# Patient Record
Sex: MALE | Race: BLACK OR AFRICAN AMERICAN | Employment: OTHER | URBAN - METROPOLITAN AREA
[De-identification: names, ages, dates, MRNs, and addresses within clinical notes are randomized per-mention and may not be internally consistent; named-entity substitution may affect disease eponyms.]

---

## 2017-02-05 ENCOUNTER — HOSPITAL ENCOUNTER (EMERGENCY)
Age: 67
Discharge: HOME | End: 2017-02-05
Attending: EMERGENCY MEDICINE
Payer: MEDICARE

## 2017-02-05 ENCOUNTER — APPOINTMENT (OUTPATIENT)
Dept: GENERAL RADIOLOGY | Age: 67
End: 2017-02-05
Attending: EMERGENCY MEDICINE
Payer: MEDICARE

## 2017-02-05 ENCOUNTER — APPOINTMENT (OUTPATIENT)
Dept: CT IMAGING | Age: 67
End: 2017-02-05
Attending: EMERGENCY MEDICINE
Payer: MEDICARE

## 2017-02-05 VITALS
TEMPERATURE: 98.5 F | BODY MASS INDEX: 23.7 KG/M2 | HEIGHT: 72 IN | HEART RATE: 76 BPM | SYSTOLIC BLOOD PRESSURE: 184 MMHG | WEIGHT: 175 LBS | DIASTOLIC BLOOD PRESSURE: 95 MMHG | OXYGEN SATURATION: 100 % | RESPIRATION RATE: 16 BRPM

## 2017-02-05 DIAGNOSIS — I10 UNCONTROLLED HYPERTENSION: ICD-10-CM

## 2017-02-05 DIAGNOSIS — R42 VERTIGO: ICD-10-CM

## 2017-02-05 DIAGNOSIS — R42 LIGHTHEADEDNESS: Primary | ICD-10-CM

## 2017-02-05 LAB
ALBUMIN SERPL BCP-MCNC: 3.2 G/DL (ref 3.4–5)
ALBUMIN/GLOB SERPL: 0.9 {RATIO} (ref 0.8–1.7)
ALP SERPL-CCNC: 103 U/L (ref 45–117)
ALT SERPL-CCNC: 18 U/L (ref 16–61)
ANION GAP BLD CALC-SCNC: 10 MMOL/L (ref 3–18)
AST SERPL W P-5'-P-CCNC: 14 U/L (ref 15–37)
BASOPHILS # BLD AUTO: 0.1 K/UL (ref 0–0.06)
BASOPHILS # BLD: 1 % (ref 0–2)
BILIRUB SERPL-MCNC: 0.5 MG/DL (ref 0.2–1)
BUN SERPL-MCNC: 14 MG/DL (ref 7–18)
BUN/CREAT SERPL: 11 (ref 12–20)
CALCIUM SERPL-MCNC: 8.8 MG/DL (ref 8.5–10.1)
CHLORIDE SERPL-SCNC: 108 MMOL/L (ref 100–108)
CK MB CFR SERPL CALC: 1.5 % (ref 0–4)
CK MB SERPL-MCNC: 2.4 NG/ML (ref 0.5–3.6)
CK SERPL-CCNC: 157 U/L (ref 39–308)
CO2 SERPL-SCNC: 26 MMOL/L (ref 21–32)
CREAT SERPL-MCNC: 1.27 MG/DL (ref 0.6–1.3)
DIFFERENTIAL METHOD BLD: ABNORMAL
EOSINOPHIL # BLD: 0.2 K/UL (ref 0–0.4)
EOSINOPHIL NFR BLD: 2 % (ref 0–5)
ERYTHROCYTE [DISTWIDTH] IN BLOOD BY AUTOMATED COUNT: 13.2 % (ref 11.6–14.5)
FLUAV AG NPH QL IA: NEGATIVE
FLUBV AG NOSE QL IA: NEGATIVE
GLOBULIN SER CALC-MCNC: 3.7 G/DL (ref 2–4)
GLUCOSE SERPL-MCNC: 92 MG/DL (ref 74–99)
HCT VFR BLD AUTO: 45.7 % (ref 36–48)
HGB BLD-MCNC: 14.6 G/DL (ref 13–16)
LYMPHOCYTES # BLD AUTO: 35 % (ref 21–52)
LYMPHOCYTES # BLD: 3.5 K/UL (ref 0.9–3.6)
MCH RBC QN AUTO: 28.3 PG (ref 24–34)
MCHC RBC AUTO-ENTMCNC: 31.9 G/DL (ref 31–37)
MCV RBC AUTO: 88.7 FL (ref 74–97)
MONOCYTES # BLD: 0.7 K/UL (ref 0.05–1.2)
MONOCYTES NFR BLD AUTO: 7 % (ref 3–10)
NEUTS SEG # BLD: 5.5 K/UL (ref 1.8–8)
NEUTS SEG NFR BLD AUTO: 55 % (ref 40–73)
PLATELET # BLD AUTO: 347 K/UL (ref 135–420)
PMV BLD AUTO: 12.5 FL (ref 9.2–11.8)
POTASSIUM SERPL-SCNC: 3.8 MMOL/L (ref 3.5–5.5)
PROT SERPL-MCNC: 6.9 G/DL (ref 6.4–8.2)
RBC # BLD AUTO: 5.15 M/UL (ref 4.7–5.5)
SODIUM SERPL-SCNC: 144 MMOL/L (ref 136–145)
TROPONIN I SERPL-MCNC: 0.02 NG/ML (ref 0–0.06)
WBC # BLD AUTO: 10 K/UL (ref 4.6–13.2)

## 2017-02-05 PROCEDURE — 87804 INFLUENZA ASSAY W/OPTIC: CPT | Performed by: EMERGENCY MEDICINE

## 2017-02-05 PROCEDURE — 80053 COMPREHEN METABOLIC PANEL: CPT | Performed by: EMERGENCY MEDICINE

## 2017-02-05 PROCEDURE — 74011250637 HC RX REV CODE- 250/637: Performed by: EMERGENCY MEDICINE

## 2017-02-05 PROCEDURE — 85025 COMPLETE CBC W/AUTO DIFF WBC: CPT | Performed by: EMERGENCY MEDICINE

## 2017-02-05 PROCEDURE — 70450 CT HEAD/BRAIN W/O DYE: CPT

## 2017-02-05 PROCEDURE — 94762 N-INVAS EAR/PLS OXIMTRY CONT: CPT

## 2017-02-05 PROCEDURE — 71020 XR CHEST PA LAT: CPT

## 2017-02-05 PROCEDURE — 74011250636 HC RX REV CODE- 250/636: Performed by: EMERGENCY MEDICINE

## 2017-02-05 PROCEDURE — 96361 HYDRATE IV INFUSION ADD-ON: CPT

## 2017-02-05 PROCEDURE — 93005 ELECTROCARDIOGRAM TRACING: CPT

## 2017-02-05 PROCEDURE — 99285 EMERGENCY DEPT VISIT HI MDM: CPT

## 2017-02-05 PROCEDURE — 82550 ASSAY OF CK (CPK): CPT | Performed by: EMERGENCY MEDICINE

## 2017-02-05 PROCEDURE — 96360 HYDRATION IV INFUSION INIT: CPT

## 2017-02-05 RX ORDER — AMLODIPINE BESYLATE 5 MG/1
5 TABLET ORAL DAILY
Qty: 20 TAB | Refills: 0 | Status: SHIPPED | OUTPATIENT
Start: 2017-02-05 | End: 2017-02-25

## 2017-02-05 RX ORDER — RANITIDINE 150 MG/1
150 TABLET, FILM COATED ORAL 2 TIMES DAILY
COMMUNITY
End: 2020-06-28

## 2017-02-05 RX ORDER — AMLODIPINE BESYLATE 5 MG/1
5 TABLET ORAL
Status: COMPLETED | OUTPATIENT
Start: 2017-02-05 | End: 2017-02-05

## 2017-02-05 RX ORDER — HYDROCHLOROTHIAZIDE 25 MG/1
25 TABLET ORAL
Status: DISCONTINUED | OUTPATIENT
Start: 2017-02-05 | End: 2017-02-05

## 2017-02-05 RX ORDER — MECLIZINE HYDROCHLORIDE 25 MG/1
25 TABLET ORAL
Qty: 21 TAB | Refills: 0 | Status: SHIPPED | OUTPATIENT
Start: 2017-02-05 | End: 2017-02-12

## 2017-02-05 RX ORDER — MECLIZINE HCL 12.5 MG 12.5 MG/1
25 TABLET ORAL ONCE
Status: COMPLETED | OUTPATIENT
Start: 2017-02-05 | End: 2017-02-05

## 2017-02-05 RX ORDER — GUAIFENESIN 100 MG/5ML
324 LIQUID (ML) ORAL
Status: COMPLETED | OUTPATIENT
Start: 2017-02-05 | End: 2017-02-05

## 2017-02-05 RX ADMIN — SODIUM CHLORIDE 500 ML: 900 INJECTION, SOLUTION INTRAVENOUS at 17:32

## 2017-02-05 RX ADMIN — AMLODIPINE BESYLATE 5 MG: 5 TABLET ORAL at 19:22

## 2017-02-05 RX ADMIN — ASPIRIN 81 MG 324 MG: 81 TABLET ORAL at 17:32

## 2017-02-05 RX ADMIN — MECLIZINE 25 MG: 12.5 TABLET ORAL at 20:45

## 2017-02-05 NOTE — ED NOTES
Dr. Issa Bean made aware of patients blood pressures, at this time he wants to wait for CT results prior to medication administration.

## 2017-02-05 NOTE — ED TRIAGE NOTES
Patient brought in by EMS from home with complaints of dizziness, generalized weakness, and generalized body aches x 3days. Pt states \"all my joints hurt\" per patient he is unable to walk to due weakness. Denies any falls. Sepsis Screening completed    (  )Patient meets SIRS criteria. ( x )Patient does not meet SIRS criteria.       SIRS Criteria is achieved when two or more of the following are present   Temperature < 96.8°F (36°C) or > 100.9°F (38.3°C)   Heart Rate > 90 beats per minute   Respiratory Rate > 20 beats per minute   WBC count > 12,000 or <4,000 or > 10% bands

## 2017-02-05 NOTE — ED PROVIDER NOTES
HPI Comments:   5:09 PM   Renay Cartwright is a 78 y.o. male presenting to the ED C/O generalized body aches at joints onset a few days ago. Pt reports he also has sternal pain. Associated sx's include dizziness that causes him to become weak and unable to walk, chills, and cough. Family history includes DM. Pt smokes 1 pack of cigarettes a day. Pt takes Zantac. Pt was last seen by PCP 7 years ago. Pt denies fever, nausea, vomiting, dysuria, change in urine color, HTN, DM, high cholesterol, heart problems, drinking alcohol, appendectomy, cholecystectomy, use of illicit drugs and any other symptoms or complaints. Written by TRACEY Jimenez, as dictated by Padmini Soliz MD      Patient is a 78 y.o. male presenting with weakness and general illness. The history is provided by the patient. No  was used. Extremity Weakness    This is a new problem. The current episode started more than 2 days ago. The problem occurs daily. The problem has not changed since onset. Generalized Body Aches          Past Medical History:   Diagnosis Date    Peptic ulcer disease        Past Surgical History:   Procedure Laterality Date    Hx orthopaedic       knee surgery         History reviewed. No pertinent family history. Social History     Social History    Marital status: SINGLE     Spouse name: N/A    Number of children: N/A    Years of education: N/A     Occupational History    Not on file. Social History Main Topics    Smoking status: Current Every Day Smoker    Smokeless tobacco: Not on file    Alcohol use No    Drug use: No    Sexual activity: Not on file     Other Topics Concern    Not on file     Social History Narrative    No narrative on file         ALLERGIES: Sulfa (sulfonamide antibiotics)    Review of Systems   Constitutional: Positive for chills. Negative for fever. Respiratory: Positive for cough. Gastrointestinal: Negative for nausea and vomiting.    Genitourinary: Negative for dysuria. Musculoskeletal: Positive for arthralgias. Neurological: Positive for dizziness and weakness. All other systems reviewed and are negative. Vitals:    02/05/17 1830 02/05/17 1900 02/05/17 1922 02/05/17 1957   BP: (!) 182/99  (!) 206/120 (!) 194/91   Pulse: 76 76 83 82   Resp: 19 25     Temp:       SpO2: 99% 99%     Weight:       Height:                Physical Exam   Constitutional: He is oriented to person, place, and time. He appears well-developed and well-nourished. HENT:   Head: Normocephalic and atraumatic. Right Ear: External ear normal.   Left Ear: External ear normal.   Nose: Nose normal.   Mouth/Throat: Oropharynx is clear and moist.   Eyes: Conjunctivae and EOM are normal. Pupils are equal, round, and reactive to light. Neck: Normal range of motion. Neck supple. No JVD present. No tracheal deviation present. No thyromegaly present. Cardiovascular: Normal rate, regular rhythm, normal heart sounds and intact distal pulses. Exam reveals no gallop and no friction rub. No murmur heard. Pulmonary/Chest: Effort normal and breath sounds normal. No respiratory distress. He has no wheezes. He has no rales. Abdominal: Soft. Bowel sounds are normal. He exhibits no distension and no mass. There is no tenderness. There is no rebound and no guarding. Musculoskeletal: Normal range of motion. Neurological: He is alert and oriented to person, place, and time. He has normal reflexes. No cranial nerve deficit. CN II-XII intact, no facial droop or asymmetry; No pronator drift; finger-nose-finger intact; good  and equal strength 5/5 bilateral upper and lower extremities; DTRs: 2+ upper and lower extremities, symmetric bilaterally; sensation is intact to light touch and position sense upper and lower extremities symmetric bilaterally;  Gait normal   Skin: Skin is warm and dry. No rash noted. Psychiatric: He has a normal mood and affect.  His behavior is normal. Nursing note and vitals reviewed. Vital signs and nursing notes reviewed    CONSTITUTIONAL: Alert, in no apparent distress; well-developed; well-nourished. Pt became light headed when he sat up for respiratory exam   HEAD:  Normocephalic, atraumatic  EYES: PERRL; EOM's intact. ENTM: Nose: no rhinorrhea; Throat: no erythema or exudate, mucous membranes moist  Neck:  No JVD, supple without lymphadenopathy  RESP: Chest clear, equal breath sounds. CV: S1 and S2 WNL; No murmurs, gallops or rubs. GI: Normal bowel sounds, abdomen soft and non-tender. No masses or organomegaly. : No costo-vertebral angle tenderness. BACK:  Non-tender  UPPER EXT:  Normal inspection +5/5 strength bilaterally     LOWER EXT: No edema, no calf tenderness. Distal pulses intact. +5/5 strength bilaterally  NEURO: CN intact, reflexes 2/4 and sym, strength 5/5 and sym, sensation intact. SKIN: No rashes; Normal for age and stage. PSYCH:  Alert and oriented, normal affect. RESULTS:    EKG interpretation: (Preliminary)  5:20 PM   Rate 80 bpm. NSR. Nonspecific T wave abnormality. Abnormal ECG. No stemi. EKG read by Rafaela Brewer MD  at 16:56     CT HEAD WO CONT   Final Result   No CT evidence of acute intracranial abnormality. As read by the radiologist.   XR CHEST PA LAT   Final Result   No radiographic evidence of acute cardiopulmonary abnormality. As read by the radiologist.        Labs Reviewed   CBC WITH AUTOMATED DIFF - Abnormal; Notable for the following:        Result Value    MPV 12.5 (*)     ABS.  BASOPHILS 0.1 (*)     All other components within normal limits   METABOLIC PANEL, COMPREHENSIVE - Abnormal; Notable for the following:     BUN/Creatinine ratio 11 (*)     GFR est non-AA 57 (*)     AST (SGOT) 14 (*)     Albumin 3.2 (*)     All other components within normal limits   INFLUENZA A & B AG (RAPID TEST)   CARDIAC PANEL,(CK, CKMB & TROPONIN)       Recent Results (from the past 12 hour(s))   EKG, 12 LEAD, INITIAL Collection Time: 02/05/17  4:56 PM   Result Value Ref Range    Ventricular Rate 80 BPM    Atrial Rate 80 BPM    P-R Interval 152 ms    QRS Duration 86 ms    Q-T Interval 382 ms    QTC Calculation (Bezet) 440 ms    Calculated P Axis 62 degrees    Calculated R Axis 30 degrees    Calculated T Axis 50 degrees    Diagnosis       Normal sinus rhythm  Nonspecific T wave abnormality  Abnormal ECG  No previous ECGs available     CBC WITH AUTOMATED DIFF    Collection Time: 02/05/17  5:00 PM   Result Value Ref Range    WBC 10.0 4.6 - 13.2 K/uL    RBC 5.15 4.70 - 5.50 M/uL    HGB 14.6 13.0 - 16.0 g/dL    HCT 45.7 36.0 - 48.0 %    MCV 88.7 74.0 - 97.0 FL    MCH 28.3 24.0 - 34.0 PG    MCHC 31.9 31.0 - 37.0 g/dL    RDW 13.2 11.6 - 14.5 %    PLATELET 567 064 - 995 K/uL    MPV 12.5 (H) 9.2 - 11.8 FL    NEUTROPHILS 55 40 - 73 %    LYMPHOCYTES 35 21 - 52 %    MONOCYTES 7 3 - 10 %    EOSINOPHILS 2 0 - 5 %    BASOPHILS 1 0 - 2 %    ABS. NEUTROPHILS 5.5 1.8 - 8.0 K/UL    ABS. LYMPHOCYTES 3.5 0.9 - 3.6 K/UL    ABS. MONOCYTES 0.7 0.05 - 1.2 K/UL    ABS. EOSINOPHILS 0.2 0.0 - 0.4 K/UL    ABS.  BASOPHILS 0.1 (H) 0.0 - 0.06 K/UL    DF AUTOMATED     INFLUENZA A & B AG (RAPID TEST)    Collection Time: 02/05/17  5:34 PM   Result Value Ref Range    Influenza A Antigen NEGATIVE  NEG      Influenza B Antigen NEGATIVE  NEG     CARDIAC PANEL,(CK, CKMB & TROPONIN)    Collection Time: 02/05/17  7:09 PM   Result Value Ref Range     39 - 308 U/L    CK - MB 2.4 0.5 - 3.6 ng/ml    CK-MB Index 1.5 0.0 - 4.0 %    Troponin-I, Qt. 0.02 0.00 - 2.87 NG/ML   METABOLIC PANEL, COMPREHENSIVE    Collection Time: 02/05/17  7:09 PM   Result Value Ref Range    Sodium 144 136 - 145 mmol/L    Potassium 3.8 3.5 - 5.5 mmol/L    Chloride 108 100 - 108 mmol/L    CO2 26 21 - 32 mmol/L    Anion gap 10 3.0 - 18 mmol/L    Glucose 92 74 - 99 mg/dL    BUN 14 7.0 - 18 MG/DL    Creatinine 1.27 0.6 - 1.3 MG/DL    BUN/Creatinine ratio 11 (L) 12 - 20      GFR est AA >60 >60 ml/min/1.73m2    GFR est non-AA 57 (L) >60 ml/min/1.73m2    Calcium 8.8 8.5 - 10.1 MG/DL    Bilirubin, total 0.5 0.2 - 1.0 MG/DL    ALT (SGPT) 18 16 - 61 U/L    AST (SGOT) 14 (L) 15 - 37 U/L    Alk. phosphatase 103 45 - 117 U/L    Protein, total 6.9 6.4 - 8.2 g/dL    Albumin 3.2 (L) 3.4 - 5.0 g/dL    Globulin 3.7 2.0 - 4.0 g/dL    A-G Ratio 0.9 0.8 - 1.7          MDM  Number of Diagnoses or Management Options  Lightheadedness:   Uncontrolled hypertension:   Vertigo:   Diagnosis management comments: INITIAL CLINICAL IMPRESSION and PLANS:  The patient presents with the primary complaint(s) of: weakness. The presentation, to include historical aspects and clinical findings are consistent with the DX of uncontrolled hypertension . However, other possible DX's to consider as primary, associated with, or exacerbated by include:    1. Influenza  2. ACS  3. Metabolic  4. ICH    Considering the above, my initial management plan to evaluate and therapeutic interventions include the following and as noted in the orders:    1. Labs: CBC, CMP, cardiac panel  2. Imaging: CXR, head CT     The patient was stable in the ED. ECG and troponin showed no evidence of ACS. Head CT scan unremarkable. CXR unremarkable. Labs unremarkable. Patient was given meclizine and Norvasc with improvement. Vertigo resolved. BP improved. The patient will follow-up with PCP and outpatient Cardiology and Neurology evaluations.   Written by Carol Ann Barahona MD.       Amount and/or Complexity of Data Reviewed  Clinical lab tests: ordered and reviewed  Tests in the radiology section of CPT®: ordered and reviewed (Head CT, CXR)  Tests in the medicine section of CPT®: reviewed and ordered (EKG)  Discuss the patient with other providers: yes Rebecca Martin MD (ED attending))  Independent visualization of images, tracings, or specimens: yes (EKG, CXR)      ED Course     MEDICATIONS GIVEN:  Medications   meclizine (ANTIVERT) tablet 25 mg (not administered)   aspirin chewable tablet 324 mg (324 mg Oral Given 2/5/17 1732)   sodium chloride 0.9 % bolus infusion 500 mL (0 mL IntraVENous IV Completed 2/5/17 1922)   amLODIPine (NORVASC) tablet 5 mg (5 mg Oral Given 2/5/17 1922)        Procedures    PROGRESS NOTE:  5:09 PM  Initial assessment performed. Written by Adarsh Harrison, ED Scribe, as dictated by Mone Brush MD     BEDSIDE TRANSFER OF CARE:  7:04 PM  Patient care will be transferred from Mone Brush MD  to Eliseo Orozco MD .  Discussed available diagnostic results and care plan at length at beside. Patient and family made aware of provider change. All questions answered. Patient and family voiced understanding. Written by Adarsh Harrison, TRACEY Scribe, as dictated by Mone Brush MD .     08:20PM  Patient improved with resolution of vertigo and decreased BP. Patient will follow-up with PCP. DISCHARGE NOTE:  8:26 PM   Anette Pollen  results have been reviewed with him. He has been counseled regarding his diagnosis, treatment, and plan. He verbally conveys understanding and agreement of the signs, symptoms, diagnosis, treatment and prognosis and additionally agrees to follow up as discussed. He also agrees with the care-plan and conveys that all of his questions have been answered. I have also provided discharge instructions for him that include: educational information regarding their diagnosis and treatment, and list of reasons why they would want to return to the ED prior to their follow-up appointment, should his condition change. The patient and/or family has been provided with education for proper Emergency Department utilization. CLINICAL IMPRESSION:    1. Lightheadedness    2. Vertigo    3.  Uncontrolled hypertension        PLAN: DISCHARGE HOME    Follow-up Information     Follow up With Details Comments Contact Info    Balta Pro, DO Schedule an appointment as soon as possible for a visit in 2 days or follow up with your PCP [address] [address]  [phone]      Kirsten Woo MD Schedule an appointment as soon as possible for a visit in 2 days Follow up with cardiology [address]  [phone]      Reyes Lombardi MD Schedule an appointment as soon as possible for a visit in 2 days Follow up with neurology [address]      THE [facility] EMERGENCY DEPT Go to As needed, If symptoms worsen [address] [place] [address]  [phone]          Current Discharge Medication List      START taking these medications    Details   meclizine (ANTIVERT) 25 mg tablet Take 1 Tab by mouth three (3) times daily as needed for up to 7 days. Qty: 21 Tab, Refills: 0      amLODIPine (NORVASC) 5 mg tablet Take 1 Tab by mouth daily for 20 days. Qty: 20 Tab, Refills: 0         CONTINUE these medications which have NOT CHANGED    Details   raNITIdine (ZANTAC) 150 mg tablet Take 150 mg by mouth two (2) times a day. ATTESTATIONS:  This note is prepared by Ines Tucker, acting as Scribe for Shaneka Archer MD .    Shaneka Archer MD : The scribe's documentation has been prepared under my direction and personally reviewed by me in its entirety. I confirm that the note above accurately reflects all work, treatment, procedures, and medical decision making performed by me.

## 2017-02-06 NOTE — ED NOTES
Pt discharged home stable via wheelchair. Pain level at discharge unchanged. Pt discharged with spouse. Reviewed discharged instructions with patient who verbalized understanding.   Patient armband removed and shredded

## 2017-02-06 NOTE — ED NOTES
Report received. Care of pt assumed at this time. Pt resting in position of comfort. Family at bedside. Call bell in reach. Updated on plan of care, verbalized understanding.  Provided with urinal.

## 2017-02-06 NOTE — DISCHARGE INSTRUCTIONS
Please start a low salt diet (less than 2 grams). High Blood Pressure: Care Instructions  Your Care Instructions  If your blood pressure is usually above 140/90, you have high blood pressure, or hypertension. That means the top number is 140 or higher or the bottom number is 90 or higher, or both. Despite what a lot of people think, high blood pressure usually doesn't cause headaches or make you feel dizzy or lightheaded. It usually has no symptoms. But it does increase your risk for heart attack, stroke, and kidney or eye damage. The higher your blood pressure, the more your risk increases. Your doctor will give you a goal for your blood pressure. Your goal will be based on your health and your age. An example of a goal is to keep your blood pressure below 140/90. Lifestyle changes, such as eating healthy and being active, are always important to help lower blood pressure. You might also take medicine to reach your blood pressure goal.  Follow-up care is a key part of your treatment and safety. Be sure to make and go to all appointments, and call your doctor if you are having problems. It's also a good idea to know your test results and keep a list of the medicines you take. How can you care for yourself at home? Medical treatment  · If you stop taking your medicine, your blood pressure will go back up. You may take one or more types of medicine to lower your blood pressure. Be safe with medicines. Take your medicine exactly as prescribed. Call your doctor if you think you are having a problem with your medicine. · Talk to your doctor before you start taking aspirin every day. Aspirin can help certain people lower their risk of a heart attack or stroke. But taking aspirin isn't right for everyone, because it can cause serious bleeding. · See your doctor regularly. You may need to see the doctor more often at first or until your blood pressure comes down.   · If you are taking blood pressure medicine, talk to your doctor before you take decongestants or anti-inflammatory medicine, such as ibuprofen. Some of these medicines can raise blood pressure. · Learn how to check your blood pressure at home. Lifestyle changes  · Stay at a healthy weight. This is especially important if you put on weight around the waist. Losing even 10 pounds can help you lower your blood pressure. · If your doctor recommends it, get more exercise. Walking is a good choice. Bit by bit, increase the amount you walk every day. Try for at least 30 minutes on most days of the week. You also may want to swim, bike, or do other activities. · Avoid or limit alcohol. Talk to your doctor about whether you can drink any alcohol. · Try to limit how much sodium you eat to less than 2,300 milligrams (mg) a day. Your doctor may ask you to try to eat less than 1,500 mg a day. · Eat plenty of fruits (such as bananas and oranges), vegetables, legumes, whole grains, and low-fat dairy products. · Lower the amount of saturated fat in your diet. Saturated fat is found in animal products such as milk, cheese, and meat. Limiting these foods may help you lose weight and also lower your risk for heart disease. · Do not smoke. Smoking increases your risk for heart attack and stroke. If you need help quitting, talk to your doctor about stop-smoking programs and medicines. These can increase your chances of quitting for good. When should you call for help? Call 911 anytime you think you may need emergency care. This may mean having symptoms that suggest that your blood pressure is causing a serious heart or blood vessel problem. Your blood pressure may be over 180/110. For example, call 911 if:  · You have symptoms of a heart attack. These may include:  ¨ Chest pain or pressure, or a strange feeling in the chest.  ¨ Sweating. ¨ Shortness of breath. ¨ Nausea or vomiting.   ¨ Pain, pressure, or a strange feeling in the back, neck, jaw, or upper belly or in one or both shoulders or arms. ¨ Lightheadedness or sudden weakness. ¨ A fast or irregular heartbeat. · You have symptoms of a stroke. These may include:  ¨ Sudden numbness, tingling, weakness, or loss of movement in your face, arm, or leg, especially on only one side of your body. ¨ Sudden vision changes. ¨ Sudden trouble speaking. ¨ Sudden confusion or trouble understanding simple statements. ¨ Sudden problems with walking or balance. ¨ A sudden, severe headache that is different from past headaches. · You have severe back or belly pain. Do not wait until your blood pressure comes down on its own. Get help right away. Call your doctor now or seek immediate care if:  · Your blood pressure is much higher than normal (such as 180/110 or higher), but you don't have symptoms. · You think high blood pressure is causing symptoms, such as:  ¨ Severe headache. ¨ Blurry vision. Watch closely for changes in your health, and be sure to contact your doctor if:  · Your blood pressure measures 140/90 or higher at least 2 times. That means the top number is 140 or higher or the bottom number is 90 or higher, or both. · You think you may be having side effects from your blood pressure medicine. · Your blood pressure is usually normal, but it goes above normal at least 2 times. Where can you learn more? Go to http://ayan-bjorn.info/. Enter L661 in the search box to learn more about \"High Blood Pressure: Care Instructions. \"  Current as of: August 8, 2016  Content Version: 11.1  © 9413-7390 [employer]. Care instructions adapted under license by [employer] (which disclaims liability or warranty for this information). If you have questions about a medical condition or this instruction, always ask your healthcare professional. [place] [address] any warranty or liability for your use of this information.        Lightheadedness or Faintness: Care Instructions  Your Care Instructions  Lightheadedness is a feeling that you are about to faint or \"pass out. \" You do not feel as if you or your surroundings are moving. It is different from vertigo, which is the feeling that you or things around you are spinning or tilting. Lightheadedness usually goes away or gets better when you lie down. If lightheadedness gets worse, it can lead to a fainting spell. It is common to feel lightheaded from time to time. Lightheadedness usually is not caused by a serious problem. It often is caused by a short-lasting drop in blood pressure and blood flow to your head that occurs when you get up too quickly from a seated or lying position. Follow-up care is a key part of your treatment and safety. Be sure to make and go to all appointments, and call your doctor if you are having problems. It's also a good idea to know your test results and keep a list of the medicines you take. How can you care for yourself at home? · Lie down for 1 or 2 minutes when you feel lightheaded. After lying down, sit up slowly and remain sitting for 1 to 2 minutes before slowly standing up. · Avoid movements, positions, or activities that have made you lightheaded in the past.  · Get plenty of rest, especially if you have a cold or flu, which can cause lightheadedness. · Make sure you drink plenty of fluids, especially if you have a fever or have been sweating. · Do not drive or put yourself and others in danger while you feel lightheaded. When should you call for help? Call 911 anytime you think you may need emergency care. For example, call if:  · You have symptoms of a stroke. These may include:  ¨ Sudden numbness, tingling, weakness, or loss of movement in your face, arm, or leg, especially on only one side of your body. ¨ Sudden vision changes. ¨ Sudden trouble speaking. ¨ Sudden confusion or trouble understanding simple statements. ¨ Sudden problems with walking or balance.   ¨ A sudden, severe headache that is different from past headaches. · You have symptoms of a heart attack. These may include:  ¨ Chest pain or pressure, or a strange feeling in the chest.  ¨ Sweating. ¨ Shortness of breath. ¨ Nausea or vomiting. ¨ Pain, pressure, or a strange feeling in the back, neck, jaw, or upper belly or in one or both shoulders or arms. ¨ Lightheadedness or sudden weakness. ¨ A fast or irregular heartbeat. After you call 911, the  may tell you to chew 1 adult-strength or 2 to 4 low-dose aspirin. Wait for an ambulance. Do not try to drive yourself. Watch closely for changes in your health, and be sure to contact your doctor if:  · Your lightheadedness gets worse or does not get better with home care. Where can you learn more? Go to http://ayanCourse Herobjorn.info/. Enter R290 in the search box to learn more about \"Lightheadedness or Faintness: Care Instructions. \"  Current as of: May 27, 2016  Content Version: 11.1  © 2727-1681 [employer]. Care instructions adapted under license by [employer] (which disclaims liability or warranty for this information). If you have questions about a medical condition or this instruction, always ask your healthcare professional. [place] [address] any warranty or liability for your use of this information. Vertigo: Care Instructions  Your Care Instructions  Vertigo is the feeling that you or your surroundings are moving when there is no actual movement. It is often described as a feeling of spinning, whirling, falling, or tilting. Vertigo may make you vomit or feel nauseated. You may have trouble standing or walking and may lose your balance. Vertigo is often related to an inner ear problem, but it can have other more serious causes. If vertigo continues, you may need more tests to find its cause. Follow-up care is a key part of your treatment and safety.  Be sure to make and go to all appointments, and call your doctor if you are having problems. Its also a good idea to know your test results and keep a list of the medicines you take. How can you care for yourself at home? · Do not lie flat on your back. Prop yourself up slightly. This may reduce the spinning feeling. Keep your eyes open. · Move slowly so that you do not fall. · If your doctor recommends medicine, take it exactly as directed. · Do not drive while you are having vertigo. Certain exercises, called Silva-Daroff exercises, can help decrease vertigo. To do Silva-Daroff exercises:  · Sit on the edge of a bed or sofa and quickly lie down on the side that causes the worst vertigo. Lie on your side with your ear down. · Stay in this position for at least 30 seconds or until the vertigo goes away. · Sit up. If this causes vertigo, wait for it to stop. · Repeat the procedure on the other side. · Repeat this 10 times. Do these exercises 2 times a day until the vertigo is gone. When should you call for help? Call 911 anytime you think you may need emergency care. For example, call if:  · You passed out (lost consciousness). · You have symptoms of a stroke. These may include:  ¨ Sudden numbness, tingling, weakness, or loss of movement in your face, arm, or leg, especially on only one side of your body. ¨ Sudden vision changes. ¨ Sudden trouble speaking. ¨ Sudden confusion or trouble understanding simple statements. ¨ Sudden problems with walking or balance. ¨ A sudden, severe headache that is different from past headaches. Call your doctor now or seek immediate medical care if:  · Vertigo occurs with a fever, a headache, or ringing in your ears. · You have new or increased nausea and vomiting. Watch closely for changes in your health, and be sure to contact your doctor if:  · Vertigo gets worse or happens more often. · Vertigo has not gotten better after 2 weeks. Where can you learn more?   Go to http://ayan-bjorn.info/. Enter R257 in the search box to learn more about \"Vertigo: Care Instructions. \"  Current as of: July 29, 2016  Content Version: 11.1  © 9413-6357 [employer]. Care instructions adapted under license by [employer] (which disclaims liability or warranty for this information). If you have questions about a medical condition or this instruction, always ask your healthcare professional. [place] [address] any warranty or liability for your use of this information. Low Sodium Diet (2,000 Milligram): Care Instructions  Your Care Instructions  Too much sodium causes your body to hold on to extra water. This can raise your blood pressure and force your heart and kidneys to work harder. In very serious cases, this could cause you to be put in the hospital. It might even be life-threatening. By limiting sodium, you will feel better and lower your risk of serious problems. The most common source of sodium is salt. People get most of the salt in their diet from canned, prepared, and packaged foods. Fast food and restaurant meals also are very high in sodium. Your doctor will probably limit your sodium to less than 2,000 milligrams (mg) a day. This limit counts all the sodium in prepared and packaged foods and any salt you add to your food. Follow-up care is a key part of your treatment and safety. Be sure to make and go to all appointments, and call your doctor if you are having problems. It's also a good idea to know your test results and keep a list of the medicines you take. How can you care for yourself at home? Read food labels  · Read labels on cans and food packages. The labels tell you how much sodium is in each serving. Make sure that you look at the serving size. If you eat more than the serving size, you have eaten more sodium. · Food labels also tell you the Percent Daily Value for sodium.  Choose products with low Percent Daily Values for sodium. · Be aware that sodium can come in forms other than salt, including monosodium glutamate (MSG), sodium citrate, and sodium bicarbonate (baking soda). MSG is often added to Asian food. When you eat out, you can sometimes ask for food without MSG or added salt. Buy low-sodium foods  · Buy foods that are labeled \"unsalted\" (no salt added), \"sodium-free\" (less than 5 mg of sodium per serving), or \"low-sodium\" (less than 140 mg of sodium per serving). Foods labeled \"reduced-sodium\" and \"light sodium\" may still have too much sodium. Be sure to read the label to see how much sodium you are getting. · Buy fresh vegetables, or frozen vegetables without added sauces. Buy low-sodium versions of canned vegetables, soups, and other canned goods. Prepare low-sodium meals  · Cut back on the amount of salt you use in cooking. This will help you adjust to the taste. Do not add salt after cooking. One teaspoon of salt has about 2,300 mg of sodium. · Take the salt shaker off the table. · Flavor your food with garlic, lemon juice, onion, vinegar, herbs, and spices. Do not use soy sauce, lite soy sauce, steak sauce, onion salt, garlic salt, celery salt, mustard, or ketchup on your food. · Use low-sodium salad dressings, sauces, and ketchup. Or make your own salad dressings and sauces without adding salt. · Use less salt (or none) when recipes call for it. You can often use half the salt a recipe calls for without losing flavor. Other foods such as rice, pasta, and grains do not need added salt. · Rinse canned vegetables, and cook them in fresh water. This removes some--but not all--of the salt. · Avoid water that is naturally high in sodium or that has been treated with water softeners, which add sodium. Call your local water company to find out the sodium content of your water supply. If you buy bottled water, read the label and choose a sodium-free brand.   Avoid high-sodium foods  · Avoid eating:  ¨ Smoked, cured, salted, and canned meat, fish, and poultry. ¨ Ham, betts, hot dogs, and luncheon meats. ¨ Regular, hard, and processed cheese and regular peanut butter. ¨ Crackers with salted tops, and other salted snack foods such as pretzels, chips, and salted popcorn. ¨ Frozen prepared meals, unless labeled low-sodium. ¨ Canned and dried soups, broths, and bouillon, unless labeled sodium-free or low-sodium. ¨ Canned vegetables, unless labeled sodium-free or low-sodium. ¨ [place] [employer] fries, pizza, tacos, and other fast foods. ¨ Pickles, olives, ketchup, and other condiments, especially soy sauce, unless labeled sodium-free or low-sodium. Where can you learn more? Go to http://ayan-bjorn.info/. Enter J897 in the search box to learn more about \"Low Sodium Diet (2,000 Milligram): Care Instructions. \"  Current as of: July 26, 2016  Content Version: 11.1  © 7474-1157 [employer]. Care instructions adapted under license by "[employer] (which disclaims liability or warranty for this information). If you have questions about a medical condition or this instruction, always ask your healthcare professional. [place] [address] any warranty or liability for your use of this information.

## 2017-02-06 NOTE — ED NOTES
Bedside and Verbal shift change report given to SHAHRAM Holman (oncoming nurse) by Pedro Knapp RN   (offgoing nurse). Report included the following information SBAR, Kardex, Intake/Output, MAR and Recent Results.

## 2017-02-11 LAB
ATRIAL RATE: 80 BPM
CALCULATED P AXIS, ECG09: 62 DEGREES
CALCULATED R AXIS, ECG10: 30 DEGREES
CALCULATED T AXIS, ECG11: 50 DEGREES
DIAGNOSIS, 93000: NORMAL
P-R INTERVAL, ECG05: 152 MS
Q-T INTERVAL, ECG07: 382 MS
QRS DURATION, ECG06: 86 MS
QTC CALCULATION (BEZET), ECG08: 440 MS
VENTRICULAR RATE, ECG03: 80 BPM

## 2019-06-21 ENCOUNTER — HOSPITAL ENCOUNTER (EMERGENCY)
Age: 69
Discharge: HOME | End: 2019-06-22
Attending: EMERGENCY MEDICINE
Payer: MEDICARE

## 2019-06-21 DIAGNOSIS — Z87.891 PAST USE OF TOBACCO: ICD-10-CM

## 2019-06-21 DIAGNOSIS — E11.9 TYPE 2 DIABETES MELLITUS WITHOUT COMPLICATION, WITHOUT LONG-TERM CURRENT USE OF INSULIN (HCC): ICD-10-CM

## 2019-06-21 DIAGNOSIS — R73.9 HYPERGLYCEMIA: Primary | ICD-10-CM

## 2019-06-21 LAB
ALBUMIN SERPL-MCNC: 3.1 G/DL (ref 3.4–5)
ALBUMIN/GLOB SERPL: 1.1 {RATIO} (ref 0.8–1.7)
ALP SERPL-CCNC: 112 U/L (ref 45–117)
ALT SERPL-CCNC: 24 U/L (ref 16–61)
ANION GAP SERPL CALC-SCNC: 17 MMOL/L (ref 3–18)
APPEARANCE UR: CLEAR
AST SERPL-CCNC: 14 U/L (ref 15–37)
BACTERIA URNS QL MICRO: ABNORMAL /HPF
BASOPHILS # BLD: 0 K/UL (ref 0–0.1)
BASOPHILS NFR BLD: 0 % (ref 0–2)
BILIRUB SERPL-MCNC: 0.7 MG/DL (ref 0.2–1)
BILIRUB UR QL: NEGATIVE
BUN SERPL-MCNC: 39 MG/DL (ref 7–18)
BUN/CREAT SERPL: 22 (ref 12–20)
CALCIUM SERPL-MCNC: 8.7 MG/DL (ref 8.5–10.1)
CHLORIDE SERPL-SCNC: 91 MMOL/L (ref 100–108)
CK MB CFR SERPL CALC: 0.9 % (ref 0–4)
CK MB SERPL-MCNC: 2.9 NG/ML (ref 5–25)
CK SERPL-CCNC: 336 U/L (ref 39–308)
CO2 SERPL-SCNC: 22 MMOL/L (ref 21–32)
COLOR UR: YELLOW
CREAT SERPL-MCNC: 1.75 MG/DL (ref 0.6–1.3)
DIFFERENTIAL METHOD BLD: ABNORMAL
EOSINOPHIL # BLD: 0.2 K/UL (ref 0–0.4)
EOSINOPHIL NFR BLD: 2 % (ref 0–5)
EPITH CASTS URNS QL MICRO: ABNORMAL /LPF (ref 0–5)
ERYTHROCYTE [DISTWIDTH] IN BLOOD BY AUTOMATED COUNT: 12.2 % (ref 11.6–14.5)
GLOBULIN SER CALC-MCNC: 2.9 G/DL (ref 2–4)
GLUCOSE BLD STRIP.AUTO-MCNC: 348 MG/DL (ref 70–110)
GLUCOSE BLD STRIP.AUTO-MCNC: 545 MG/DL (ref 70–110)
GLUCOSE SERPL-MCNC: 579 MG/DL (ref 74–99)
GLUCOSE UR STRIP.AUTO-MCNC: >1000 MG/DL
HCT VFR BLD AUTO: 42.7 % (ref 36–48)
HGB BLD-MCNC: 14.6 G/DL (ref 13–16)
HGB UR QL STRIP: ABNORMAL
KETONES UR QL STRIP.AUTO: 40 MG/DL
LEUKOCYTE ESTERASE UR QL STRIP.AUTO: ABNORMAL
LYMPHOCYTES # BLD: 2.6 K/UL (ref 0.9–3.6)
LYMPHOCYTES NFR BLD: 24 % (ref 21–52)
MAGNESIUM SERPL-MCNC: 2.7 MG/DL (ref 1.6–2.6)
MCH RBC QN AUTO: 28.5 PG (ref 24–34)
MCHC RBC AUTO-ENTMCNC: 34.2 G/DL (ref 31–37)
MCV RBC AUTO: 83.2 FL (ref 74–97)
MONOCYTES # BLD: 0.9 K/UL (ref 0.05–1.2)
MONOCYTES NFR BLD: 8 % (ref 3–10)
NEUTS SEG # BLD: 7 K/UL (ref 1.8–8)
NEUTS SEG NFR BLD: 66 % (ref 40–73)
NITRITE UR QL STRIP.AUTO: NEGATIVE
PH UR STRIP: 5 [PH] (ref 5–8)
PHOSPHATE SERPL-MCNC: 4.5 MG/DL (ref 2.5–4.9)
PLATELET # BLD AUTO: 239 K/UL (ref 135–420)
PMV BLD AUTO: 12.9 FL (ref 9.2–11.8)
POTASSIUM SERPL-SCNC: 4.3 MMOL/L (ref 3.5–5.5)
PROT SERPL-MCNC: 6 G/DL (ref 6.4–8.2)
PROT UR STRIP-MCNC: NEGATIVE MG/DL
RBC # BLD AUTO: 5.13 M/UL (ref 4.7–5.5)
RBC #/AREA URNS HPF: ABNORMAL /HPF (ref 0–5)
SODIUM SERPL-SCNC: 130 MMOL/L (ref 136–145)
SP GR UR REFRACTOMETRY: >1.03 (ref 1–1.03)
TROPONIN I SERPL-MCNC: 0.02 NG/ML (ref 0–0.04)
UROBILINOGEN UR QL STRIP.AUTO: 0.2 EU/DL (ref 0.2–1)
WBC # BLD AUTO: 10.8 K/UL (ref 4.6–13.2)
WBC URNS QL MICRO: ABNORMAL /HPF (ref 0–5)
YEAST URNS QL MICRO: ABNORMAL

## 2019-06-21 PROCEDURE — 96361 HYDRATE IV INFUSION ADD-ON: CPT

## 2019-06-21 PROCEDURE — 85025 COMPLETE CBC W/AUTO DIFF WBC: CPT

## 2019-06-21 PROCEDURE — 83735 ASSAY OF MAGNESIUM: CPT

## 2019-06-21 PROCEDURE — 96374 THER/PROPH/DIAG INJ IV PUSH: CPT

## 2019-06-21 PROCEDURE — 82962 GLUCOSE BLOOD TEST: CPT

## 2019-06-21 PROCEDURE — 74011636637 HC RX REV CODE- 636/637: Performed by: EMERGENCY MEDICINE

## 2019-06-21 PROCEDURE — 84100 ASSAY OF PHOSPHORUS: CPT

## 2019-06-21 PROCEDURE — 74011250636 HC RX REV CODE- 250/636: Performed by: EMERGENCY MEDICINE

## 2019-06-21 PROCEDURE — 81001 URINALYSIS AUTO W/SCOPE: CPT

## 2019-06-21 PROCEDURE — 96376 TX/PRO/DX INJ SAME DRUG ADON: CPT

## 2019-06-21 PROCEDURE — 80053 COMPREHEN METABOLIC PANEL: CPT

## 2019-06-21 PROCEDURE — 82550 ASSAY OF CK (CPK): CPT

## 2019-06-21 PROCEDURE — 99285 EMERGENCY DEPT VISIT HI MDM: CPT

## 2019-06-21 RX ORDER — METFORMIN HYDROCHLORIDE 500 MG/1
1000 TABLET ORAL 2 TIMES DAILY WITH MEALS
COMMUNITY

## 2019-06-21 RX ORDER — HYDROCHLOROTHIAZIDE 25 MG/1
25 TABLET ORAL DAILY
COMMUNITY
End: 2019-10-01

## 2019-06-21 RX ORDER — AMLODIPINE BESYLATE 10 MG/1
10 TABLET ORAL DAILY
COMMUNITY

## 2019-06-21 RX ORDER — CHLORPROMAZINE HYDROCHLORIDE 25 MG/1
50 TABLET, FILM COATED ORAL
Status: COMPLETED | OUTPATIENT
Start: 2019-06-21 | End: 2019-06-22

## 2019-06-21 RX ORDER — SODIUM CHLORIDE 0.9 % (FLUSH) 0.9 %
5-40 SYRINGE (ML) INJECTION AS NEEDED
Status: DISCONTINUED | OUTPATIENT
Start: 2019-06-21 | End: 2019-06-22 | Stop reason: HOSPADM

## 2019-06-21 RX ORDER — METOCLOPRAMIDE 10 MG/1
10 TABLET ORAL
COMMUNITY
End: 2019-10-01

## 2019-06-21 RX ORDER — SODIUM CHLORIDE 0.9 % (FLUSH) 0.9 %
5-40 SYRINGE (ML) INJECTION EVERY 8 HOURS
Status: DISCONTINUED | OUTPATIENT
Start: 2019-06-21 | End: 2019-06-22 | Stop reason: HOSPADM

## 2019-06-21 RX ADMIN — SODIUM CHLORIDE 500 ML: 900 INJECTION, SOLUTION INTRAVENOUS at 21:24

## 2019-06-21 RX ADMIN — HUMAN INSULIN 10 UNITS: 100 INJECTION, SOLUTION SUBCUTANEOUS at 23:55

## 2019-06-21 RX ADMIN — SODIUM CHLORIDE 500 ML: 900 INJECTION, SOLUTION INTRAVENOUS at 22:50

## 2019-06-21 RX ADMIN — HUMAN INSULIN 10 UNITS: 100 INJECTION, SOLUTION SUBCUTANEOUS at 22:51

## 2019-06-21 RX ADMIN — Medication 10 ML: at 23:57

## 2019-06-22 VITALS
OXYGEN SATURATION: 96 % | TEMPERATURE: 98.4 F | HEART RATE: 86 BPM | SYSTOLIC BLOOD PRESSURE: 149 MMHG | WEIGHT: 173 LBS | BODY MASS INDEX: 23.43 KG/M2 | RESPIRATION RATE: 16 BRPM | DIASTOLIC BLOOD PRESSURE: 82 MMHG | HEIGHT: 72 IN

## 2019-06-22 LAB — GLUCOSE BLD STRIP.AUTO-MCNC: 211 MG/DL (ref 70–110)

## 2019-06-22 PROCEDURE — 82962 GLUCOSE BLOOD TEST: CPT

## 2019-06-22 PROCEDURE — 74011250637 HC RX REV CODE- 250/637: Performed by: EMERGENCY MEDICINE

## 2019-06-22 RX ORDER — CHLORPROMAZINE HYDROCHLORIDE 50 MG/1
50 TABLET, FILM COATED ORAL
Qty: 20 TAB | Refills: 0 | Status: SHIPPED | OUTPATIENT
Start: 2019-06-22 | End: 2019-10-01

## 2019-06-22 RX ORDER — CHLORPROMAZINE HYDROCHLORIDE 50 MG/1
100 TABLET, FILM COATED ORAL
Qty: 20 TAB | Refills: 0 | Status: SHIPPED | OUTPATIENT
Start: 2019-06-22 | End: 2019-06-22

## 2019-06-22 RX ADMIN — CHLORPROMAZINE HYDROCHLORIDE 50 MG: 25 TABLET, SUGAR COATED ORAL at 00:36

## 2019-06-22 NOTE — DISCHARGE INSTRUCTIONS
Type 2 Diabetes: Care Instructions  Your Care Instructions    Type 2 diabetes is a disease that develops when the body's tissues cannot use insulin properly. Over time, the pancreas cannot make enough insulin. Insulin is a hormone that helps the body's cells use sugar (glucose) for energy. It also helps the body store extra sugar in muscle, fat, and liver cells. Without insulin, the sugar cannot get into the cells to do its work. It stays in the blood instead. This can cause high blood sugar levels. A person has diabetes when the blood sugar stays too high too much of the time. Over time, diabetes can lead to diseases of the heart, blood vessels, nerves, kidneys, and eyes. You may be able to control your blood sugar by losing weight, eating a healthy diet, and getting daily exercise. You may also have to take insulin or other diabetes medicine. Follow-up care is a key part of your treatment and safety. Be sure to make and go to all appointments. Call your doctor if you are having problems. It's also a good idea to know your test results and keep a list of the medicines you take. How can you care for yourself at home? · Keep your blood sugar at a target level (which you set with your doctor). ? Eat a good diet that spreads carbohydrate throughout the day. Carbohydrate--the body's main source of fuel--affects blood sugar more than any other nutrient. Carbohydrate is in fruits, vegetables, milk, and yogurt. It also is in breads, cereals, vegetables such as potatoes and corn, and sugary foods such as candy and cakes. ? Aim for 30 minutes of exercise on most, preferably all, days of the week. Walking is a good choice. You also may want to do other activities, such as running, swimming, cycling, or playing tennis or team sports. If your doctor says it's okay, do muscle-strengthening exercises at least 2 times a week. ? Take your medicines exactly as prescribed.  Call your doctor if you think you are having a problem with your medicine. You will get more details on the specific medicines your doctor prescribes. · Check your blood sugar as often as your doctor recommends. It is important to keep track of any symptoms you have, such as low blood sugar. Also tell your doctor if you have any changes in your activities, diet, or insulin use. · Talk to your doctor before you start taking aspirin every day. Aspirin can help certain people lower their risk of a heart attack or stroke. But taking aspirin isn't right for everyone, because it can cause serious bleeding. · Do not smoke. If you need help quitting, talk to your doctor about stop-smoking programs and medicines. These can increase your chances of quitting for good. · Keep your cholesterol and blood pressure at normal levels. You may need to take one or more medicines to reach your goals. Take them exactly as directed. Do not stop or change a medicine without talking to your doctor first.  When should you call for help? Call 911 anytime you think you may need emergency care. For example, call if:    · You passed out (lost consciousness), or you suddenly become very sleepy or confused. (You may have very low blood sugar.)    Call your doctor now or seek immediate medical care if:    · Your blood sugar is 300 mg/dL or is higher than the level your doctor has set for you.     · You have symptoms of low blood sugar, such as:  ? Sweating. ? Feeling nervous, shaky, and weak. ? Extreme hunger and slight nausea. ? Dizziness and headache.  ? Blurred vision. ? Confusion.    Watch closely for changes in your health, and be sure to contact your doctor if:    · You often have problems controlling your blood sugar.     · You have symptoms of long-term diabetes problems, such as:  ? New vision changes. ? New pain, numbness, or tingling in your hands or feet. ? Skin problems. Where can you learn more? Go to http://ayan-bjorn.info/.   Enter C553 in the search box to learn more about \"Type 2 Diabetes: Care Instructions. \"  Current as of: July 25, 2018  Content Version: 11.9  © 1018-0160 [employer]. Care instructions adapted under license by [employer] (which disclaims liability or warranty for this information). If you have questions about a medical condition or this instruction, always ask your healthcare professional. [facility] [address] any warranty or liability for your use of this information. Learning About High Blood Sugar  What is high blood sugar? Your body turns the food you eat into glucose (sugar), which it uses for energy. But if your body isn't able to use the sugar right away, it can build up in your blood and lead to high blood sugar. When the amount of sugar in your blood stays too high for too much of the time, you may have diabetes. Diabetes is a disease that can cause serious health problems. The good news is that lifestyle changes may help you get your blood sugar back to normal and avoid or delay diabetes. What causes high blood sugar? Sugar (glucose) can build up in your blood if you:  · Are overweight. · Have a family history of diabetes. · Take certain medicines, such as steroids. What are the symptoms? Having high blood sugar may not cause any symptoms at all. Or it may make you feel very thirsty or very hungry. You may also urinate more often than usual, have blurry vision, or lose weight without trying. How is high blood sugar treated? You can take steps to lower your blood sugar level if you understand what makes it get higher. Your doctor may want you to learn how to test your blood sugar level at home. Then you can see how illness, stress, or different kinds of food or medicine raise or lower your blood sugar level. Other tests may be needed to see if you have diabetes. How can you prevent high blood sugar? · Watch your weight.  If you're overweight, losing just a small amount of weight may help. Reducing fat around your waist is most important. · Limit the amount of calories, sweets, and unhealthy fat you eat. Ask your doctor if a dietitian can help you. A registered dietitian can help you create meal plans that fit your lifestyle. · Get at least 30 minutes of exercise on most days of the week. Exercise helps control your blood sugar. It also helps you maintain a healthy weight. Walking is a good choice. You also may want to do other activities, such as running, swimming, cycling, or playing tennis or team sports. · If your doctor prescribed medicines, take them exactly as prescribed. Call your doctor if you think you are having a problem with your medicine. You will get more details on the specific medicines your doctor prescribes. Follow-up care is a key part of your treatment and safety. Be sure to make and go to all appointments, and call your doctor if you are having problems. It's also a good idea to know your test results and keep a list of the medicines you take. Where can you learn more? Go to http://ayan-bjorn.info/. Enter O108 in the search box to learn more about \"Learning About High Blood Sugar. \"  Current as of: July 25, 2018  Content Version: 11.9  © 5279-4180 [employer]. Care instructions adapted under license by [employer] (which disclaims liability or warranty for this information). If you have questions about a medical condition or this instruction, always ask your healthcare professional. [address] any warranty or liability for your use of this information.

## 2019-06-22 NOTE — ED PROVIDER NOTES
EMERGENCY DEPARTMENT HISTORY AND PHYSICAL EXAM    Date: 6/21/2019  Patient Name: Alina Smith    History of Presenting Illness     Chief Complaint   Patient presents with    High Blood Sugar         History Provided By: patient     Additional History (Context):   9:58 PM  Alina Smith is a 77 y.o. male with PMHX of PUD who presents to the emergency department via EMS C/O high blood sugar reading today. Associated sxs include nausea, vomiting x2 day, b/l elbow pain, b/l knee pain, dry heaving, polyuria, polydipsia. Per EMS, pt had high blood sugar in route. Reports that he was recently diagnosed with DMII at his PCP's office ([facility]) and was started on metformin and insulin. He did not take his insulin this morning. Pt has a follow up appointment with his PCP next week. He is a former regular smoker and quit 2 weeks ago. Pt denies chest pain, SOB, abd pain, dizziness, fever, chills, leg swelling, and any other sxs or complaints. PCP: [facility], Not On File    Current Facility-Administered Medications   Medication Dose Route Frequency Provider Last Rate Last Dose    sodium chloride (NS) flush 5-40 mL  5-40 mL IntraVENous Q8H Loreto ARREOLA MD   10 mL at 06/21/19 1377    sodium chloride (NS) flush 5-40 mL  5-40 mL IntraVENous PRN Jean Paul Aleman MD         Current Outpatient Medications   Medication Sig Dispense Refill    amLODIPine (NORVASC) 10 mg tablet Take 10 mg by mouth daily.  metFORMIN (GLUCOPHAGE) 500 mg tablet Take 500 mg by mouth two (2) times daily (with meals).  hydroCHLOROthiazide (HYDRODIURIL) 25 mg tablet Take 25 mg by mouth daily.  metoclopramide HCl (REGLAN) 10 mg tablet Take 10 mg by mouth Before breakfast, lunch, dinner and at bedtime.  needles, insulin disposable (INSULIN PEN NEEDLE) by Does Not Apply route.  raNITIdine (ZANTAC) 150 mg tablet Take 150 mg by mouth two (2) times a day.          Past History     Past Medical History:  Past Medical History: Diagnosis Date    Peptic ulcer disease        Past Surgical History:  Past Surgical History:   Procedure Laterality Date    HX ORTHOPAEDIC      knee surgery       Family History:  History reviewed. No pertinent family history. Social History:  Social History     Tobacco Use    Smoking status: Former Smoker    Smokeless tobacco: Never Used   Substance Use Topics    Alcohol use: No    Drug use: No       Allergies: Allergies   Allergen Reactions    Sulfa (Sulfonamide Antibiotics) Unknown (comments)         Review of Systems   Review of Systems   Respiratory: Negative for shortness of breath. Cardiovascular: Negative for chest pain and leg swelling. Gastrointestinal: Positive for nausea and vomiting. Negative for abdominal pain. (+) dry heaving   Endocrine: Positive for polydipsia and polyuria. Musculoskeletal: Positive for arthralgias (b/l knees, b/l elbows). Neurological: Negative for dizziness. All other systems reviewed and are negative. Physical Exam     Vitals:    06/21/19 2200 06/21/19 2300 06/22/19 0000 06/22/19 0030   BP:  183/90 147/90 158/82   Pulse:       Resp:       Temp:       SpO2: 99% 98%  97%   Weight:       Height:         Physical Exam   Constitutional: He is oriented to person, place, and time. He appears well-developed and well-nourished. No distress. HENT:   Head: Normocephalic and atraumatic. Right Ear: External ear normal.   Left Ear: External ear normal.   Mouth/Throat: Oropharynx is clear and moist. Mucous membranes are dry. No oropharyngeal exudate. Eyes: Pupils are equal, round, and reactive to light. Conjunctivae and EOM are normal. No scleral icterus. Sunken eyes. Neck: Normal range of motion. Neck supple. No JVD present. No tracheal deviation present. No thyromegaly present. Cardiovascular: Normal rate, regular rhythm and normal heart sounds. Pulmonary/Chest: Effort normal and breath sounds normal. No stridor. No respiratory distress. Abdominal: Soft. Bowel sounds are normal. He exhibits no distension. There is no tenderness. There is no rebound and no guarding. Musculoskeletal: Normal range of motion. He exhibits no edema or tenderness. Atrophy in hands b/l. Lymphadenopathy:     He has no cervical adenopathy. Neurological: He is alert and oriented to person, place, and time. He has normal reflexes. No cranial nerve deficit. Coordination normal.   Skin: Skin is warm and dry. No rash noted. He is not diaphoretic. No erythema. Poor skin turgor. Psychiatric: He has a normal mood and affect. His behavior is normal. Judgment and thought content normal.   Nursing note and vitals reviewed. Diagnostic Study Results     Labs -     Recent Results (from the past 12 hour(s))   CBC WITH AUTOMATED DIFF    Collection Time: 06/21/19  8:55 PM   Result Value Ref Range    WBC 10.8 4.6 - 13.2 K/uL    RBC 5.13 4.70 - 5.50 M/uL    HGB 14.6 13.0 - 16.0 g/dL    HCT 42.7 36.0 - 48.0 %    MCV 83.2 74.0 - 97.0 FL    MCH 28.5 24.0 - 34.0 PG    MCHC 34.2 31.0 - 37.0 g/dL    RDW 12.2 11.6 - 14.5 %    PLATELET 115 327 - 003 K/uL    MPV 12.9 (H) 9.2 - 11.8 FL    NEUTROPHILS 66 40 - 73 %    LYMPHOCYTES 24 21 - 52 %    MONOCYTES 8 3 - 10 %    EOSINOPHILS 2 0 - 5 %    BASOPHILS 0 0 - 2 %    ABS. NEUTROPHILS 7.0 1.8 - 8.0 K/UL    ABS. LYMPHOCYTES 2.6 0.9 - 3.6 K/UL    ABS. MONOCYTES 0.9 0.05 - 1.2 K/UL    ABS. EOSINOPHILS 0.2 0.0 - 0.4 K/UL    ABS.  BASOPHILS 0.0 0.0 - 0.1 K/UL    DF AUTOMATED     METABOLIC PANEL, COMPREHENSIVE    Collection Time: 06/21/19  8:55 PM   Result Value Ref Range    Sodium 130 (L) 136 - 145 mmol/L    Potassium 4.3 3.5 - 5.5 mmol/L    Chloride 91 (L) 100 - 108 mmol/L    CO2 22 21 - 32 mmol/L    Anion gap 17 3.0 - 18 mmol/L    Glucose 579 (HH) 74 - 99 mg/dL    BUN 39 (H) 7.0 - 18 MG/DL    Creatinine 1.75 (H) 0.6 - 1.3 MG/DL    BUN/Creatinine ratio 22 (H) 12 - 20      GFR est AA 47 (L) >60 ml/min/1.73m2    GFR est non-AA 39 (L) >60 ml/min/1.73m2    Calcium 8.7 8.5 - 10.1 MG/DL    Bilirubin, total 0.7 0.2 - 1.0 MG/DL    ALT (SGPT) 24 16 - 61 U/L    AST (SGOT) 14 (L) 15 - 37 U/L    Alk.  phosphatase 112 45 - 117 U/L    Protein, total 6.0 (L) 6.4 - 8.2 g/dL    Albumin 3.1 (L) 3.4 - 5.0 g/dL    Globulin 2.9 2.0 - 4.0 g/dL    A-G Ratio 1.1 0.8 - 1.7     MAGNESIUM    Collection Time: 06/21/19  8:55 PM   Result Value Ref Range    Magnesium 2.7 (H) 1.6 - 2.6 mg/dL   PHOSPHORUS    Collection Time: 06/21/19  8:55 PM   Result Value Ref Range    Phosphorus 4.5 2.5 - 4.9 MG/DL   CARDIAC PANEL,(CK, CKMB & TROPONIN)    Collection Time: 06/21/19  8:55 PM   Result Value Ref Range     (H) 39 - 308 U/L    CK - MB 2.9 <3.6 ng/ml    CK-MB Index 0.9 0.0 - 4.0 %    Troponin-I, QT 0.02 0.0 - 0.045 NG/ML   GLUCOSE, POC    Collection Time: 06/21/19  9:06 PM   Result Value Ref Range    Glucose (POC) 545 (HH) 70 - 110 mg/dL   URINALYSIS W/ RFLX MICROSCOPIC    Collection Time: 06/21/19 10:50 PM   Result Value Ref Range    Color YELLOW      Appearance CLEAR      Specific gravity >1.030 (H) 1.005 - 1.030    pH (UA) 5.0 5.0 - 8.0      Protein NEGATIVE  NEG mg/dL    Glucose >1,000 (A) NEG mg/dL    Ketone 40 (A) NEG mg/dL    Bilirubin NEGATIVE  NEG      Blood TRACE (A) NEG      Urobilinogen 0.2 0.2 - 1.0 EU/dL    Nitrites NEGATIVE  NEG      Leukocyte Esterase SMALL (A) NEG     URINE MICROSCOPIC ONLY    Collection Time: 06/21/19 10:50 PM   Result Value Ref Range    WBC 10 to 12 0 - 5 /hpf    RBC 1 to 2 0 - 5 /hpf    Epithelial cells 1 to 2 0 - 5 /lpf    Bacteria 1+ (A) NEG /hpf    Yeast 1+ (A) NEG   GLUCOSE, POC    Collection Time: 06/21/19 11:23 PM   Result Value Ref Range    Glucose (POC) 348 (H) 70 - 110 mg/dL   GLUCOSE, POC    Collection Time: 06/22/19 12:37 AM   Result Value Ref Range    Glucose (POC) 211 (H) 70 - 110 mg/dL       Radiologic Studies -   No orders to display     CT Results  (Last 48 hours)    None        CXR Results  (Last 48 hours)    None Medications given in the ED-  Medications   sodium chloride (NS) flush 5-40 mL (10 mL IntraVENous Given 6/21/19 2357)   sodium chloride (NS) flush 5-40 mL (has no administration in time range)   sodium chloride 0.9 % bolus infusion 500 mL (0 mL IntraVENous IV Completed 6/21/19 2224)   insulin regular (NOVOLIN R, HUMULIN R) injection 10 Units (10 Units IntraVENous Given 6/21/19 2251)   sodium chloride 0.9 % bolus infusion 500 mL (0 mL IntraVENous IV Completed 6/21/19 2333)   insulin regular (NOVOLIN R, HUMULIN R) injection 10 Units (10 Units IntraVENous Given 6/21/19 2355)   chlorproMAZINE (THORAZINE) tablet 50 mg (50 mg Oral Given 6/22/19 0036)         Medical Decision Making   I am the first provider for this patient. I reviewed the vital signs, available nursing notes, past medical history, past surgical history, family history and social history. Vital Signs-Reviewed the patient's vital signs. Pulse Oximetry Analysis - 99% on room air     Cardiac Monitor:  Rate: 88 bpm  Rhythm: NSR    Records Reviewed: Nursing Notes and Old Medical Records    Provider Notes (Medical Decision Making): No signs of infection or infarct, sxs likely due to recent diabetes diagnosis and insulin metabolism. Procedures:  Procedures    ED Course:   9:58 PM Initial assessment performed. The patients presenting problems have been discussed, and they are in agreement with the care plan formulated and outlined with them. I joey encouraged them to ask questions as they arise throughout their visit. Diagnosis and Disposition       DISCHARGE NOTE:  12:57 AM  Paul Brunner  results have been reviewed with him. He has been counseled regarding his diagnosis, treatment, and plan. He verbally conveys understanding and agreement of the signs, symptoms, diagnosis, treatment and prognosis and additionally agrees to follow up as discussed.   He also agrees with the care-plan and conveys that all of his questions have been answered. I have also provided discharge instructions for him that include: educational information regarding their diagnosis and treatment, and list of reasons why they would want to return to the ED prior to their follow-up appointment, should his condition change. He has been provided with education for proper emergency department utilization. CLINICAL IMPRESSION:    1. Hyperglycemia    2. Type 2 diabetes mellitus without complication, without long-term current use of insulin (HCC)    3. Past use of tobacco        PLAN:  1. D/C Home  2. Current Discharge Medication List        3. Follow-up Information     Follow up With Specialties Details Why Contact Info    PCP  Go to For your scheduled appointment next week. [facility] Emergency Medicine  As needed, if symptoms worsen [address] [id number]  [phone]        _______________________________    Attestations: This note is prepared by Ra Gill, acting as Scribe for Nishi Escobar. Bhargav Reed MD.    Nishi Escobar. Bhargav Reed MD:  The scribe's documentation has been prepared under my direction and personally reviewed by me in its entirety.   I confirm that the note above accurately reflects all work, treatment, procedures, and medical decision making performed by me.  _______________________________

## 2019-06-22 NOTE — ED TRIAGE NOTES
Pt to ED via EMS for high blood sugar, EMS [facility] read HI en route. Pt states he was HI at home. Pt is A&O x 4 and is able to speak in full, complete sentences to make needs known.

## 2019-06-22 NOTE — ED NOTES
Pt hourly rounding competed. Safety   Pt (x) resting on stretcher with side rails up and call bell in reach. () in chair    () in parents arms. Toileting   Pt offered ()Bedpan     ()Assistance to Restroom     (x)Urinal  Ongoing Updates  Updated on plan of care and status of test results. Pain Management  Inquired as to comfort and offered comfort measures:    (x) warm blankets   (x) dimmed lights    Pt resting in stretcher in NAD. [facility] 211. Will update provider. No further needs expressed.

## 2019-06-22 NOTE — ED NOTES
Discharge instructions reviewed with opportunity for questions provided. Pt vocalized understanding. Armband removed and shredded. Pt stable condition at time of discharge. Family at bedside.

## 2019-08-21 ENCOUNTER — APPOINTMENT (OUTPATIENT)
Dept: GENERAL RADIOLOGY | Age: 69
DRG: 069 | End: 2019-08-21
Attending: EMERGENCY MEDICINE
Payer: MEDICARE

## 2019-08-21 ENCOUNTER — APPOINTMENT (OUTPATIENT)
Dept: MRI IMAGING | Age: 69
DRG: 069 | End: 2019-08-21
Attending: HOSPITALIST
Payer: MEDICARE

## 2019-08-21 ENCOUNTER — HOSPITAL ENCOUNTER (INPATIENT)
Age: 69
LOS: 1 days | Discharge: HOME | DRG: 069 | End: 2019-08-23
Attending: EMERGENCY MEDICINE | Admitting: HOSPITALIST
Payer: MEDICARE

## 2019-08-21 ENCOUNTER — APPOINTMENT (OUTPATIENT)
Dept: CT IMAGING | Age: 69
DRG: 069 | End: 2019-08-21
Attending: EMERGENCY MEDICINE
Payer: MEDICARE

## 2019-08-21 DIAGNOSIS — I63.9 CEREBROVASCULAR ACCIDENT (CVA), UNSPECIFIED MECHANISM (HCC): Primary | ICD-10-CM

## 2019-08-21 PROBLEM — E11.9 DM W/O COMPLICATION TYPE II (HCC): Status: ACTIVE | Noted: 2019-08-21

## 2019-08-21 PROBLEM — M19.90 OA (OSTEOARTHRITIS): Status: ACTIVE | Noted: 2019-08-21

## 2019-08-21 PROBLEM — F17.200 SMOKER: Status: ACTIVE | Noted: 2019-08-21

## 2019-08-21 PROBLEM — G45.9 TIA (TRANSIENT ISCHEMIC ATTACK): Status: ACTIVE | Noted: 2019-08-21

## 2019-08-21 PROBLEM — I10 HTN (HYPERTENSION): Status: ACTIVE | Noted: 2019-08-21

## 2019-08-21 PROBLEM — E87.6 HYPOKALEMIA: Status: ACTIVE | Noted: 2019-08-21

## 2019-08-21 LAB
ALBUMIN SERPL-MCNC: 3.2 G/DL (ref 3.4–5)
ALBUMIN/GLOB SERPL: 0.9 {RATIO} (ref 0.8–1.7)
ALP SERPL-CCNC: 77 U/L (ref 45–117)
ALT SERPL-CCNC: 24 U/L (ref 16–61)
ANION GAP SERPL CALC-SCNC: 6 MMOL/L (ref 3–18)
AST SERPL-CCNC: 17 U/L (ref 10–38)
ATRIAL RATE: 79 BPM
BASOPHILS # BLD: 0.1 K/UL (ref 0–0.1)
BASOPHILS NFR BLD: 1 % (ref 0–2)
BILIRUB SERPL-MCNC: 0.4 MG/DL (ref 0.2–1)
BUN SERPL-MCNC: 19 MG/DL (ref 7–18)
BUN/CREAT SERPL: 20 (ref 12–20)
CALCIUM SERPL-MCNC: 9.2 MG/DL (ref 8.5–10.1)
CALCULATED P AXIS, ECG09: 66 DEGREES
CALCULATED R AXIS, ECG10: 21 DEGREES
CALCULATED T AXIS, ECG11: 53 DEGREES
CHLORIDE SERPL-SCNC: 102 MMOL/L (ref 100–111)
CO2 SERPL-SCNC: 31 MMOL/L (ref 21–32)
CREAT SERPL-MCNC: 0.97 MG/DL (ref 0.6–1.3)
DIAGNOSIS, 93000: NORMAL
DIFFERENTIAL METHOD BLD: ABNORMAL
EOSINOPHIL # BLD: 0.1 K/UL (ref 0–0.4)
EOSINOPHIL NFR BLD: 1 % (ref 0–5)
ERYTHROCYTE [DISTWIDTH] IN BLOOD BY AUTOMATED COUNT: 13.8 % (ref 11.6–14.5)
EST. AVERAGE GLUCOSE BLD GHB EST-MCNC: 117 MG/DL
GLOBULIN SER CALC-MCNC: 3.6 G/DL (ref 2–4)
GLUCOSE BLD STRIP.AUTO-MCNC: 118 MG/DL (ref 70–110)
GLUCOSE BLD STRIP.AUTO-MCNC: 140 MG/DL (ref 70–110)
GLUCOSE SERPL-MCNC: 126 MG/DL (ref 74–99)
HBA1C MFR BLD: 5.7 % (ref 4.2–5.6)
HCT VFR BLD AUTO: 40.5 % (ref 36–48)
HGB BLD-MCNC: 13 G/DL (ref 13–16)
LYMPHOCYTES # BLD: 2.8 K/UL (ref 0.9–3.6)
LYMPHOCYTES NFR BLD: 25 % (ref 21–52)
MCH RBC QN AUTO: 28.6 PG (ref 24–34)
MCHC RBC AUTO-ENTMCNC: 32.1 G/DL (ref 31–37)
MCV RBC AUTO: 89 FL (ref 74–97)
MONOCYTES # BLD: 0.8 K/UL (ref 0.05–1.2)
MONOCYTES NFR BLD: 8 % (ref 3–10)
NEUTS SEG # BLD: 7.4 K/UL (ref 1.8–8)
NEUTS SEG NFR BLD: 65 % (ref 40–73)
P-R INTERVAL, ECG05: 156 MS
PLATELET # BLD AUTO: 437 K/UL (ref 135–420)
PMV BLD AUTO: 9.9 FL (ref 9.2–11.8)
POTASSIUM SERPL-SCNC: 3.4 MMOL/L (ref 3.5–5.5)
PROT SERPL-MCNC: 6.8 G/DL (ref 6.4–8.2)
Q-T INTERVAL, ECG07: 400 MS
QRS DURATION, ECG06: 88 MS
QTC CALCULATION (BEZET), ECG08: 458 MS
RBC # BLD AUTO: 4.55 M/UL (ref 4.7–5.5)
SODIUM SERPL-SCNC: 139 MMOL/L (ref 136–145)
TROPONIN I SERPL-MCNC: <0.02 NG/ML (ref 0–0.04)
VENTRICULAR RATE, ECG03: 79 BPM
WBC # BLD AUTO: 11.1 K/UL (ref 4.6–13.2)

## 2019-08-21 PROCEDURE — 70551 MRI BRAIN STEM W/O DYE: CPT

## 2019-08-21 PROCEDURE — 80053 COMPREHEN METABOLIC PANEL: CPT

## 2019-08-21 PROCEDURE — 99285 EMERGENCY DEPT VISIT HI MDM: CPT

## 2019-08-21 PROCEDURE — 74011636637 HC RX REV CODE- 636/637: Performed by: HOSPITALIST

## 2019-08-21 PROCEDURE — 83036 HEMOGLOBIN GLYCOSYLATED A1C: CPT

## 2019-08-21 PROCEDURE — 74011250637 HC RX REV CODE- 250/637: Performed by: HOSPITALIST

## 2019-08-21 PROCEDURE — 85025 COMPLETE CBC W/AUTO DIFF WBC: CPT

## 2019-08-21 PROCEDURE — 97530 THERAPEUTIC ACTIVITIES: CPT

## 2019-08-21 PROCEDURE — 74011250636 HC RX REV CODE- 250/636: Performed by: HOSPITALIST

## 2019-08-21 PROCEDURE — 70450 CT HEAD/BRAIN W/O DYE: CPT

## 2019-08-21 PROCEDURE — 93005 ELECTROCARDIOGRAM TRACING: CPT

## 2019-08-21 PROCEDURE — 80307 DRUG TEST PRSMV CHEM ANLYZR: CPT

## 2019-08-21 PROCEDURE — 81003 URINALYSIS AUTO W/O SCOPE: CPT

## 2019-08-21 PROCEDURE — 71045 X-RAY EXAM CHEST 1 VIEW: CPT

## 2019-08-21 PROCEDURE — 84484 ASSAY OF TROPONIN QUANT: CPT

## 2019-08-21 PROCEDURE — 97162 PT EVAL MOD COMPLEX 30 MIN: CPT

## 2019-08-21 PROCEDURE — 74011250637 HC RX REV CODE- 250/637: Performed by: EMERGENCY MEDICINE

## 2019-08-21 PROCEDURE — 82962 GLUCOSE BLOOD TEST: CPT

## 2019-08-21 PROCEDURE — 99218 HC RM OBSERVATION: CPT

## 2019-08-21 PROCEDURE — 70496 CT ANGIOGRAPHY HEAD: CPT

## 2019-08-21 PROCEDURE — 74011636320 HC RX REV CODE- 636/320: Performed by: EMERGENCY MEDICINE

## 2019-08-21 RX ORDER — ASPIRIN 325 MG
325 TABLET ORAL
Status: COMPLETED | OUTPATIENT
Start: 2019-08-21 | End: 2019-08-21

## 2019-08-21 RX ORDER — SODIUM CHLORIDE 0.9 % (FLUSH) 0.9 %
5-40 SYRINGE (ML) INJECTION AS NEEDED
Status: DISCONTINUED | OUTPATIENT
Start: 2019-08-21 | End: 2019-08-23 | Stop reason: HOSPADM

## 2019-08-21 RX ORDER — HEPARIN SODIUM 5000 [USP'U]/ML
5000 INJECTION, SOLUTION INTRAVENOUS; SUBCUTANEOUS EVERY 8 HOURS
Status: DISCONTINUED | OUTPATIENT
Start: 2019-08-21 | End: 2019-08-23 | Stop reason: HOSPADM

## 2019-08-21 RX ORDER — INSULIN GLARGINE 100 [IU]/ML
10 INJECTION, SOLUTION SUBCUTANEOUS
Status: DISCONTINUED | OUTPATIENT
Start: 2019-08-21 | End: 2019-08-23 | Stop reason: HOSPADM

## 2019-08-21 RX ORDER — MAGNESIUM SULFATE 100 %
4 CRYSTALS MISCELLANEOUS AS NEEDED
Status: DISCONTINUED | OUTPATIENT
Start: 2019-08-21 | End: 2019-08-23 | Stop reason: HOSPADM

## 2019-08-21 RX ORDER — SODIUM CHLORIDE 9 MG/ML
100 INJECTION, SOLUTION INTRAVENOUS CONTINUOUS
Status: DISPENSED | OUTPATIENT
Start: 2019-08-21 | End: 2019-08-22

## 2019-08-21 RX ORDER — AMLODIPINE BESYLATE 5 MG/1
10 TABLET ORAL DAILY
Status: DISCONTINUED | OUTPATIENT
Start: 2019-08-22 | End: 2019-08-23 | Stop reason: HOSPADM

## 2019-08-21 RX ORDER — INSULIN LISPRO 100 [IU]/ML
INJECTION, SOLUTION INTRAVENOUS; SUBCUTANEOUS
Status: DISCONTINUED | OUTPATIENT
Start: 2019-08-21 | End: 2019-08-23 | Stop reason: HOSPADM

## 2019-08-21 RX ORDER — ACETAMINOPHEN 325 MG/1
650 TABLET ORAL
Status: DISCONTINUED | OUTPATIENT
Start: 2019-08-21 | End: 2019-08-23 | Stop reason: HOSPADM

## 2019-08-21 RX ORDER — SODIUM CHLORIDE 0.9 % (FLUSH) 0.9 %
5-40 SYRINGE (ML) INJECTION EVERY 8 HOURS
Status: DISCONTINUED | OUTPATIENT
Start: 2019-08-21 | End: 2019-08-23 | Stop reason: HOSPADM

## 2019-08-21 RX ORDER — POTASSIUM CHLORIDE 20 MEQ/1
40 TABLET, EXTENDED RELEASE ORAL
Status: COMPLETED | OUTPATIENT
Start: 2019-08-21 | End: 2019-08-21

## 2019-08-21 RX ORDER — LABETALOL HCL 20 MG/4 ML
5 SYRINGE (ML) INTRAVENOUS
Status: DISCONTINUED | OUTPATIENT
Start: 2019-08-21 | End: 2019-08-23 | Stop reason: HOSPADM

## 2019-08-21 RX ORDER — ASPIRIN 81 MG/1
81 TABLET ORAL DAILY
Status: DISCONTINUED | OUTPATIENT
Start: 2019-08-22 | End: 2019-08-22

## 2019-08-21 RX ORDER — HYDROCHLOROTHIAZIDE 25 MG/1
25 TABLET ORAL DAILY
Status: DISCONTINUED | OUTPATIENT
Start: 2019-08-22 | End: 2019-08-23 | Stop reason: HOSPADM

## 2019-08-21 RX ORDER — PANTOPRAZOLE SODIUM 40 MG/1
40 TABLET, DELAYED RELEASE ORAL DAILY
Status: DISCONTINUED | OUTPATIENT
Start: 2019-08-22 | End: 2019-08-21

## 2019-08-21 RX ORDER — IBUPROFEN 200 MG
1 TABLET ORAL DAILY
Status: DISCONTINUED | OUTPATIENT
Start: 2019-08-22 | End: 2019-08-23 | Stop reason: HOSPADM

## 2019-08-21 RX ADMIN — ASPIRIN 325 MG ORAL TABLET 325 MG: 325 PILL ORAL at 13:36

## 2019-08-21 RX ADMIN — FAMOTIDINE 20 MG: 10 INJECTION, SOLUTION INTRAVENOUS at 21:31

## 2019-08-21 RX ADMIN — POTASSIUM CHLORIDE 40 MEQ: 20 TABLET, EXTENDED RELEASE ORAL at 18:09

## 2019-08-21 RX ADMIN — INSULIN GLARGINE 10 UNITS: 100 INJECTION, SOLUTION SUBCUTANEOUS at 21:31

## 2019-08-21 RX ADMIN — Medication 10 ML: at 21:32

## 2019-08-21 RX ADMIN — IOPAMIDOL 100 ML: 755 INJECTION, SOLUTION INTRAVENOUS at 15:05

## 2019-08-21 RX ADMIN — Medication 10 ML: at 14:00

## 2019-08-21 RX ADMIN — FAMOTIDINE 20 MG: 10 INJECTION, SOLUTION INTRAVENOUS at 13:35

## 2019-08-21 RX ADMIN — HEPARIN SODIUM 5000 UNITS: 5000 INJECTION INTRAVENOUS; SUBCUTANEOUS at 21:30

## 2019-08-21 RX ADMIN — SODIUM CHLORIDE 100 ML/HR: 900 INJECTION, SOLUTION INTRAVENOUS at 18:11

## 2019-08-21 NOTE — H&P
History & Physical    Patient: Priti Boucher MRN: [id number]  CSN: [id number]    YOB: 1950  Age: 77 y.o. Sex: male      DOA: 8/21/2019  Primary Care Provider:  Other, MD Domenico      Assessment/Plan     Hospital Problems  Never Reviewed          Codes Class Noted POA    TIA (transient ischemic attack) ICD-10-CM: G45.9  ICD-9-CM: 435.9  8/21/2019         Smoker ICD-10-CM: L60.640  ICD-9-CM: 305.1  8/21/2019 Unknown        HTN (hypertension) ICD-10-CM: I10  ICD-9-CM: 401.9  8/21/2019 Unknown        DM w/o complication type II ([address] ICD-10-CM: E11.9  ICD-9-CM: 250.00  8/21/2019 Unknown        OA (osteoarthritis) ICD-10-CM: M19.90  ICD-9-CM: 715.90  8/21/2019 Unknown        Hypokalemia ICD-10-CM: E87.6  ICD-9-CM: 276.8  8/21/2019 Unknown                Admit to tele     tia   Mri no stroke   Continue aspirin add lipitor   Neuro on board  Continue neuro check  Need stop smoking   uds   cta , echo         DM type II , with complication,  -long term insulin , Complication with cva  -on lantus, ssi, diabetic diet , hypoglycemia protocol       HTN, accelerated  Continue home medication. Smoker  Need education. Nicotine patch     OA : tylenol prn     Hypokalemia   K replacement   Full code   Estimate  length of stay : 1-2 days     DVT : heparin ppi proph  CC: dizziness        HPI:     Priti Boucher is a 77 y.o. male who hx of DM, HTN, oa ,smoker came to er due to dizziness today. He was smoking outside of his house. He got up into his room and felt dizziness and left facial droop. \" code s \" was called and tele-neuro recommend admit, no tpa. He denies any speech change, his gait same, no chest pain /sob related.  Ct head no acute issue   He is a smoker, not using drugs   Visit Vitals  /78   Pulse 83   Temp 98.7 °F (37.1 °C)   Resp 16   Ht 6' (1.829 m)   Wt 77.1 kg (170 lb)   SpO2 100%   BMI 23.06 kg/m²      O2 Device: Room air      Past Medical History:   Diagnosis Date    Peptic ulcer disease Past Surgical History:   Procedure Laterality Date    HX ORTHOPAEDIC      knee surgery       History reviewed. No pertinent family history. Social History     Socioeconomic History    Marital status: COMMON LAW     Spouse name: Not on file    Number of children: Not on file    Years of education: Not on file    Highest education level: Not on file   Tobacco Use    Smoking status: Former Smoker    Smokeless tobacco: Never Used   Substance and Sexual Activity    Alcohol use: No    Drug use: No       Prior to Admission medications    Medication Sig Start Date End Date Taking? Authorizing Provider   chlorproMAZINE (THORAZINE) 50 mg tablet Take 1 Tab by mouth two (2) times daily as needed (hiccups). 6/22/19  Yes Wyatt Hashimoto, MD   amLODIPine (NORVASC) 10 mg tablet Take 10 mg by mouth daily. Yes Domenico Scott MD   metFORMIN (GLUCOPHAGE) 500 mg tablet Take 500 mg by mouth two (2) times daily (with meals). Yes Domenico Scott MD   hydroCHLOROthiazide (HYDRODIURIL) 25 mg tablet Take 25 mg by mouth daily. Yes Domenico Scott MD   metoclopramide HCl (REGLAN) 10 mg tablet Take 10 mg by mouth Before breakfast, lunch, dinner and at bedtime. Yes Tyler, MD Domenico   needles, insulin disposable (INSULIN PEN NEEDLE) by Does Not Apply route. Yes Domenico Scott MD   raNITIdine (ZANTAC) 150 mg tablet Take 150 mg by mouth two (2) times a day. Yes Domenico Scott MD       Allergies   Allergen Reactions    Sulfa (Sulfonamide Antibiotics) Unknown (comments)       Review of Systems  Gen: No fever, chills, malaise, weight loss/gain. Heent: No headache, rhinorrhea, epistaxis, ear pain, hearing loss, sinus pain, neck pain/stiffness, sore throat. Heart: No chest pain, palpitations, LEMA, pnd, or orthopnea. Resp: No cough, hemoptysis, wheezing and shortness of breath. GI: No nausea, vomiting, diarrhea, constipation, melena or hematochezia. : No urinary obstruction, dysuria or hematuria.    Derm: No rash, new skin lesion or pruritis. Musc/skeletal: no bone or joint complains. Vasc: No edema, cyanosis or claudication. Endo: No heat/cold intolerance, no polyuria,polydipsia or polyphagia. Neuro: No unilateral weakness, numbness, tingling. No seizures. Dizziness, facial drooping   Heme: No easy bruising or bleeding. Physical Exam:     Physical Exam:  Visit Vitals  /78   Pulse 83   Temp 98.7 °F (37.1 °C)   Resp 16   Ht 6' (1.829 m)   Wt 77.1 kg (170 lb)   SpO2 100%   BMI 23.06 kg/m²      O2 Device: Room air    Temp (24hrs), Av.3 °F (36.8 °C), Min:97.8 °F (36.6 °C), Max:98.7 °F (37.1 °C)    No intake/output data recorded. No intake/output data recorded. General:  Awake, cooperative, no distress. Head:  Normocephalic, without obvious abnormality, atraumatic. Eyes:  Conjunctivae/corneas clear, sclera anicteric, PERRL, EOMs intact. Nose: Nares normal. No drainage or sinus tenderness. Throat: Lips, mucosa, and tongue normal. .   Neck: Supple, symmetrical, trachea midline, no adenopathy. Lungs:   Clear to auscultation bilaterally. Heart:  Regular rate and rhythm, S1, S2 normal, no murmur, click, rub or gallop. Abdomen: Soft, non-tender. Bowel sounds normal. No masses,  No organomegaly. Extremities: Extremities normal, atraumatic, no cyanosis or edema. Deformed  Bilateral feet,    Pulses: 2+ and symmetric all extremities. Skin: Skin color-pink, texture, turgor normal except very dry skin in bilateral LE Capillary refill normal    Neurologic: CNII-XII intact. No focal motor or sensory deficit.        Labs Reviewed:    BMP:   Lab Results   Component Value Date/Time     2019 11:55 AM    K 3.4 (L) 2019 11:55 AM     2019 11:55 AM    CO2 31 2019 11:55 AM    AGAP 6 2019 11:55 AM     (H) 2019 11:55 AM    BUN 19 (H) 2019 11:55 AM    CREA 0.97 2019 11:55 AM    GFRAA >60 2019 11:55 AM    GFRNA >60 2019 11:55 AM CMP:   Lab Results   Component Value Date/Time     08/21/2019 11:55 AM    K 3.4 (L) 08/21/2019 11:55 AM     08/21/2019 11:55 AM    CO2 31 08/21/2019 11:55 AM    AGAP 6 08/21/2019 11:55 AM     (H) 08/21/2019 11:55 AM    BUN 19 (H) 08/21/2019 11:55 AM    CREA 0.97 08/21/2019 11:55 AM    GFRAA >60 08/21/2019 11:55 AM    GFRNA >60 08/21/2019 11:55 AM    CA 9.2 08/21/2019 11:55 AM    ALB 3.2 (L) 08/21/2019 11:55 AM    TP 6.8 08/21/2019 11:55 AM    GLOB 3.6 08/21/2019 11:55 AM    AGRAT 0.9 08/21/2019 11:55 AM    SGOT 17 08/21/2019 11:55 AM    ALT 24 08/21/2019 11:55 AM     CBC:   Lab Results   Component Value Date/Time    WBC 11.1 08/21/2019 11:55 AM    HGB 13.0 08/21/2019 11:55 AM    HCT 40.5 08/21/2019 11:55 AM     (H) 08/21/2019 11:55 AM     All Cardiac Markers in the last 24 hours:   Lab Results   Component Value Date/Time    TROIQ <0.02 08/21/2019 11:55 AM     Recent Glucose Results:   Lab Results   Component Value Date/Time     (H) 08/21/2019 11:55 AM     ABG: No results found for: PH, PHI, PCO2, PCO2I, PO2, PO2I, HCO3, HCO3I, FIO2, FIO2I  COAGS: No results found for: APTT, PTP, INR  Liver Panel:   Lab Results   Component Value Date/Time    ALB 3.2 (L) 08/21/2019 11:55 AM    TP 6.8 08/21/2019 11:55 AM    GLOB 3.6 08/21/2019 11:55 AM    AGRAT 0.9 08/21/2019 11:55 AM    SGOT 17 08/21/2019 11:55 AM    ALT 24 08/21/2019 11:55 AM    AP 77 08/21/2019 11:55 AM     Pancreatic Markers: No results found for: AMYLPOCT, AML, LIPPOCT, LPSE    Procedures/imaging: see electronic medical records for all procedures/Xrays and details which were not copied into this note but were reviewed prior to creation of Pool Cage MD, Internal Medicine     CC: Other, MD Domencio

## 2019-08-21 NOTE — ED TRIAGE NOTES
Sudden onset dizziness, at appx 1030.  At triage, pt appears to have a slight left sided facial droop, code stroke called

## 2019-08-21 NOTE — ROUTINE PROCESS
TRANSFER - OUT REPORT:    Verbal report [facility] on Ghanshyam Moseley  being transferred to [facility](unit) for routine progression of care       Report consisted of patients Situation, Background, Assessment and   Recommendations(SBAR). Information from the following report(s) SBAR, ED Summary, Procedure Summary, MAR, Recent Results and Cardiac Rhythm NSR was reviewed with the receiving nurse. Lines:   Peripheral IV 08/21/19 Left Hand (Active)   Site Assessment Clean, dry, & intact 8/21/2019 11:24 AM   Phlebitis Assessment 0 8/21/2019 11:24 AM   Infiltration Assessment 0 8/21/2019 11:24 AM   Dressing Status Clean, dry, & intact 8/21/2019 11:24 AM   Dressing Type Transparent 8/21/2019 11:24 AM   Hub Color/Line Status Pink 8/21/2019 11:24 AM       Peripheral IV 08/21/19 Posterior;Right Hand (Active)   Site Assessment Clean, dry, & intact 8/21/2019 12:05 PM   Phlebitis Assessment 0 8/21/2019 12:05 PM   Infiltration Assessment 0 8/21/2019 12:05 PM   Dressing Status Clean, dry, & intact 8/21/2019 12:05 PM   Dressing Type Transparent 8/21/2019 12:05 PM   Hub Color/Line Status Blue 8/21/2019 12:05 PM   Alcohol Cap Used Yes 8/21/2019 12:05 PM        Opportunity for questions and clarification was provided.       Patient transported with:   Registered Nurse

## 2019-08-21 NOTE — ED NOTES
PT in need of additional IV access for CTA, pt currently has a 20g in left hand and 22g in right hand.  ED staff unable to obtain IV at this time, Arielle Rubio RRT called by charge nurse, will come to ED to place iv before pt is moved to floor

## 2019-08-21 NOTE — ED PROVIDER NOTES
EMERGENCY DEPARTMENT HISTORY AND PHYSICAL EXAM    Date: 8/21/2019  Patient Name: Nina Khalil    History of Presenting Illness     Chief Complaint   Patient presents with    Dizziness     sudden onset         History Provided By: Patient    11:18 AM  Nina Khalil is a 77 y.o. male with PMHX of hypertension and diabetes who presents to the emergency department C/O this with associated sweating. Sleep. He also feels diffusely weak as well. Denies chest pain shortness of breath nausea vomiting or any other acute symptoms. His blood sugar upon arrival with EMS was 150. PCP: Domenico Scott MD    Current Facility-Administered Medications   Medication Dose Route Frequency Provider Last Rate Last Dose    aspirin tablet 325 mg  325 mg Oral NOW Qi Blas MD         Current Outpatient Medications   Medication Sig Dispense Refill    chlorproMAZINE (THORAZINE) 50 mg tablet Take 1 Tab by mouth two (2) times daily as needed (hiccups). 20 Tab 0    amLODIPine (NORVASC) 10 mg tablet Take 10 mg by mouth daily.  metFORMIN (GLUCOPHAGE) 500 mg tablet Take 500 mg by mouth two (2) times daily (with meals).  hydroCHLOROthiazide (HYDRODIURIL) 25 mg tablet Take 25 mg by mouth daily.  metoclopramide HCl (REGLAN) 10 mg tablet Take 10 mg by mouth Before breakfast, lunch, dinner and at bedtime.  needles, insulin disposable (INSULIN PEN NEEDLE) by Does Not Apply route.  raNITIdine (ZANTAC) 150 mg tablet Take 150 mg by mouth two (2) times a day. Past History     Past Medical History:  Past Medical History:   Diagnosis Date    Peptic ulcer disease        Past Surgical History:  Past Surgical History:   Procedure Laterality Date    HX ORTHOPAEDIC      knee surgery       Family History:  History reviewed. No pertinent family history.     Social History:  Social History     Tobacco Use    Smoking status: Former Smoker    Smokeless tobacco: Never Used   Substance Use Topics    Alcohol use: No    Drug use: No       Allergies: Allergies   Allergen Reactions    Sulfa (Sulfonamide Antibiotics) Unknown (comments)         Review of Systems   Review of Systems   Constitutional: Negative for fever. HENT: Negative for ear pain and hearing loss. Eyes: Negative for pain and visual disturbance. Respiratory: Negative for shortness of breath. Cardiovascular: Negative for chest pain. Gastrointestinal: Negative for abdominal pain. Genitourinary: Negative for difficulty urinating and dysuria. Neurological: Positive for dizziness. Negative for light-headedness and headaches. Physical Exam     Vitals:    08/21/19 1113 08/21/19 1118 08/21/19 1140 08/21/19 1210   BP: 137/71  146/68    Pulse: (!) 54  80    Resp: 16  25    Temp: 97.8 °F (36.6 °C)      SpO2: 100%  100% 100%   Weight: 77.1 kg (170 lb)      Height:  6' (1.829 m)       Physical Exam   Constitutional: He is oriented to person, place, and time. He appears well-developed. HENT:   Head: Normocephalic and atraumatic. Eyes: Pupils are equal, round, and reactive to light. Neck: Normal range of motion. Cardiovascular: Normal rate and regular rhythm. Pulmonary/Chest: Effort normal and breath sounds normal.   Abdominal: Soft. Musculoskeletal: Normal range of motion. Neurological: He is alert and oriented to person, place, and time. He has normal strength. No cranial nerve deficit or sensory deficit. GCS eye subscore is 4. GCS verbal subscore is 5. GCS motor subscore is 6. Psychiatric: He has a normal mood and affect. Nursing note and vitals reviewed.       Nursing notes and vital signs reviewed  Constitutional: Non toxic appearing, no acute distress  Head: Normocephalic, Atraumatic  Eyes: Pupils are equal, round, and reactive to light, EOMI  Neck: Supple  Cardiovascular: Regular rate and rhythm, no murmurs, rubs, or gallops  Chest: Normal work of breathing and chest excursion bilaterally  Lungs: Clear to ausculation bilaterally  Abdomen: Soft, non tender, non distended, normoactive bowel sounds  Back: No evidence of trauma or deformity  Extremities: No evidence of trauma or deformity, no LE edema  Skin: Warm and dry, normal cap refill  Neuro: Alert and appropriate, CN intact, normal speech, strength and sensation full and symmetric bilaterally, normal gait, normal coordination  Psychiatric: Normal mood and affect      Diagnostic Study Results     Labs -     Recent Results (from the past 12 hour(s))   EKG, 12 LEAD, INITIAL    Collection Time: 08/21/19 11:44 AM   Result Value Ref Range    Ventricular Rate 79 BPM    Atrial Rate 79 BPM    P-R Interval 156 ms    QRS Duration 88 ms    Q-T Interval 400 ms    QTC Calculation (Bezet) 458 ms    Calculated P Axis 66 degrees    Calculated R Axis 21 degrees    Calculated T Axis 53 degrees    Diagnosis       Poor data quality, interpretation may be adversely affected  Normal sinus rhythm  Normal ECG  When compared with ECG of 05-FEB-2017 16:56,  No significant change was found     CBC WITH AUTOMATED DIFF    Collection Time: 08/21/19 11:55 AM   Result Value Ref Range    WBC 11.1 4.6 - 13.2 K/uL    RBC 4.55 (L) 4.70 - 5.50 M/uL    HGB 13.0 13.0 - 16.0 g/dL    HCT 40.5 36.0 - 48.0 %    MCV 89.0 74.0 - 97.0 FL    MCH 28.6 24.0 - 34.0 PG    MCHC 32.1 31.0 - 37.0 g/dL    RDW 13.8 11.6 - 14.5 %    PLATELET 568 (H) 996 - 420 K/uL    MPV 9.9 9.2 - 11.8 FL    NEUTROPHILS 65 40 - 73 %    LYMPHOCYTES 25 21 - 52 %    MONOCYTES 8 3 - 10 %    EOSINOPHILS 1 0 - 5 %    BASOPHILS 1 0 - 2 %    ABS. NEUTROPHILS 7.4 1.8 - 8.0 K/UL    ABS. LYMPHOCYTES 2.8 0.9 - 3.6 K/UL    ABS. MONOCYTES 0.8 0.05 - 1.2 K/UL    ABS. EOSINOPHILS 0.1 0.0 - 0.4 K/UL    ABS. BASOPHILS 0.1 0.0 - 0.1 K/UL    DF AUTOMATED     TROPONIN I    Collection Time: 08/21/19 11:55 AM   Result Value Ref Range    Troponin-I, QT <0.02 0.0 - 0.045 NG/ML       Radiologic Studies -   CT HEAD WO CONT   Final Result   IMPRESSION:      1.  No CT findings of an acute intracranial abnormality. Please note that   noncontrast head CT may be normal in early acute infarct. Stable noncontrast CT   scan of the head but no significant change. 2. Diffuse cerebral volume loss. Code S report provided to ordering physician Estella Vasquez M.D. at [phone] on   8/21/2019, with confirmatory verbal response. XR CHEST PORT    (Results Pending)   MRI BRAIN WO CONT    (Results Pending)   CTA HEAD NECK W WO CONT    (Results Pending)     CT Results  (Last 48 hours)               08/21/19 1124  CT HEAD WO CONT Final result    Impression:  IMPRESSION:       1. No CT findings of an acute intracranial abnormality. Please note that   noncontrast head CT may be normal in early acute infarct. Stable noncontrast CT   scan of the head but no significant change. 2. Diffuse cerebral volume loss. Code S report provided to ordering physician Estella Vasquez M.D. at [phone] on   8/21/2019, with confirmatory verbal response. Narrative:  EXAM: CT Head       INDICATION: Dizziness and facial droop      > Additional: Code stroke. COMPARISON: 02/05/2017. TECHNIQUE: Axial CT imaging of the head was performed without intravenous   contrast.   One or more dose reduction techniques were used on this CT: automated exposure   control, adjustment of the mAs and/or kVp according to patient size, and   iterative reconstruction techniques. The specific techniques used on this CT   exam have been documented in the patient's electronic medical record. Digital   Imaging and Communications in Medicine (DICOM) format image data are available   to nonaffiliated external healthcare facilities or entities on a secure, media   free, reciprocally searchable basis with patient authorization for at least a   12-month period after this study.        _______________       FINDINGS:       BRAIN:      > Brain volume: Diffuse cortical accentuation of the sulcal gyral pattern,   sylvian fissures and cerebellar folia      > White matter: Little or no white matter disease. > Infarcts, encephalomalacia: None.      > Parenchymal mass: None.      > Parenchymal hemorrhage: None.      > Midline shift: None.      > Miscellaneous: None. EXTRA-AXIAL SPACES: Unremarkable. No fluid collections. CALVARIUM: Intact. SINUSES, MASTOIDS: Clear. OTHER EXTRACRANIAL: Unremarkable.       _______________               CXR Results  (Last 48 hours)    None          Medications given in the ED-  Medications   aspirin tablet 325 mg (has no administration in time range)         Medical Decision Making   I am the first provider for this patient. I reviewed the vital signs, available nursing notes, past medical history, past surgical history, family history and social history. Records Reviewed: Nursing Notes and Old Medical Records    Provider Notes (Medical Decision Making): Isrrael Solorio is a 77 y.o. male presenting with concern for sudden onset of dizziness he describes as lightheadedness with diffuse sweating. On exam patient does also have some deficits noted he has some left facial droop; formation patient was called Code S. Procedures:  Procedures    ED Course:   11:54 AM  Talked to Dr Ginny Stein and he agreed patient is having a stroke his NIH stroke score is only 2 however so he does not recommend TPA. Patient head CT. At this time patient will be given a full dose aspirin and Dr. Ginny Stein recommended admission for MRI and further stroke work-up. 12:28 PM  Discussed the case with Dr. Radha Sy and she recommended an evaluation by Dr. Aldo Persaud. Dr. Deb Contreras with neurology recommended a stat CTA head and neck and she reevaluate the patient as an inpatient. Dr. Radha Sy did agree to take over the patients care. Core Measures:  For Hospitalized Patients:    1. Hospitalization Decision Time:  The decision to hospitalize the patient was made by Mariano Rivera at 12:29 PM   on 8/21/2019    2. Aspirin: Aspirin was given    12:29 PM  Patient is being admitted to the hospital by Dr. Mariel Mary . The results of their tests and reasons for their admission have been discussed with them and/or available family. They convey agreement and understanding for the need to be admitted and for their admission diagnosis. CONDITIONS ON ADMISSION:  Sepsis is not present at the time of admission. Deep Vein Thrombosis is not present at the time of admission. Thrombosis is not present at the time of admission. Urinary Tract Infection is not present at the time of admission. Pneumonia is not present at the time of admission. MRSA is not present at the time of admission. Wound infection is not present at the time of admission. Pressure Ulcer is not present at the time of admission. CLINICAL IMPRESSION:    1. Cerebrovascular accident (CVA), unspecified mechanism ([address]          Diagnosis and Disposition           Please note that this dictation was completed with Begun, the computer voice recognition software. Quite often unanticipated grammatical, syntax, homophones, and other interpretive errors are inadvertently transcribed by the computer software. Please disregard these errors. Please excuse any errors that have escaped final proofreading.

## 2019-08-21 NOTE — PROGRESS NOTES
1350-TRANSFER - IN REPORT:    Verbal report received from Ibeth Roman RN(name) on Gui Peoples  being received from ED(unit) for routine progression of care      Report consisted of patients Situation, Background, Assessment and   Recommendations(SBAR). Information from the following report(s) SBAR, Kardex, [facility] and Cardiac Rhythm SR was reviewed with the receiving nurse. Opportunity for questions and clarification was provided. Assessment completed upon patients arrival to unit and care assumed. 1530-Pt arrived on floor. Pt AOx4, NIH completed w/ Faizan Courtney RN. Vitals taken, gown changed. 1900-Bedside and Verbal shift change report given to Leidy ALVARES (oncoming nurse) by [place] RN (offgoing nurse).  Report included the following information SBAR, Kardex, [facility] and Cardiac Rhythm SR.

## 2019-08-21 NOTE — PROGRESS NOTES
CTA H and N. Prelim Report. Done after negative MRI brain. No LVO. Intracranial atherosclerotic change with basilar artery stenosis. Full report to follow. Gabriele Monson MD  Neuroradiology, [facility]  8/21/2019

## 2019-08-22 ENCOUNTER — APPOINTMENT (OUTPATIENT)
Dept: NON INVASIVE DIAGNOSTICS | Age: 69
DRG: 069 | End: 2019-08-22
Attending: HOSPITALIST
Payer: MEDICARE

## 2019-08-22 PROBLEM — I67.2 CEREBRAL ATHEROSCLEROSIS: Status: ACTIVE | Noted: 2019-08-22

## 2019-08-22 LAB
AMPHET UR QL SCN: NEGATIVE
APPEARANCE UR: CLEAR
BARBITURATES UR QL SCN: NEGATIVE
BENZODIAZ UR QL: NEGATIVE
BILIRUB UR QL: NEGATIVE
CANNABINOIDS UR QL SCN: NEGATIVE
CHOLEST SERPL-MCNC: 199 MG/DL
COCAINE UR QL SCN: NEGATIVE
COLOR UR: YELLOW
ECHO AO ASC DIAM: 3.65 CM
ECHO AV AREA PEAK VELOCITY: 3.2 CM2
ECHO AV AREA VTI: 3.2 CM2
ECHO AV AREA/BSA PEAK VELOCITY: 1.6 CM2/M2
ECHO AV AREA/BSA VTI: 1.6 CM2/M2
ECHO AV MEAN GRADIENT: 2.1 MMHG
ECHO AV PEAK GRADIENT: 3.8 MMHG
ECHO AV PEAK VELOCITY: 98.02 CM/S
ECHO AV VTI: 15.49 CM
ECHO IVC PROX: 1.6 CM
ECHO LA AREA 2C: 9.64 CM2
ECHO LA AREA 4C: 10.4 CM2
ECHO LA MAJOR AXIS: 3.33 CM
ECHO LA VOL 2C: 23.31 ML (ref 18–58)
ECHO LA VOL 4C: 31.19 ML (ref 18–58)
ECHO LA VOL BP: 23.3 ML (ref 18–58)
ECHO LA VOL/BSA BIPLANE: 11.49 ML/M2 (ref 16–28)
ECHO LA VOLUME INDEX A2C: 11.5 ML/M2 (ref 16–28)
ECHO LA VOLUME INDEX A4C: 15.38 ML/M2 (ref 16–28)
ECHO LV E' LATERAL VELOCITY: 8 CM/S
ECHO LV E' SEPTAL VELOCITY: 5 CM/S
ECHO LV EDV A2C: 121.9 ML
ECHO LV EDV A4C: 100 ML
ECHO LV EDV BP: 115.3 ML (ref 67–155)
ECHO LV EDV INDEX A4C: 49.3 ML/M2
ECHO LV EDV INDEX BP: 56.9 ML/M2
ECHO LV EDV NDEX A2C: 60.1 ML/M2
ECHO LV EJECTION FRACTION A2C: 62 %
ECHO LV EJECTION FRACTION A4C: 54 %
ECHO LV EJECTION FRACTION BIPLANE: 58.6 % (ref 55–100)
ECHO LV ESV A2C: 46.8 ML
ECHO LV ESV A4C: 46.1 ML
ECHO LV ESV BP: 47.7 ML (ref 22–58)
ECHO LV ESV INDEX A2C: 23.1 ML/M2
ECHO LV ESV INDEX A4C: 22.7 ML/M2
ECHO LV ESV INDEX BP: 23.5 ML/M2
ECHO LV INTERNAL DIMENSION DIASTOLIC: 2.32 CM (ref 4.2–5.9)
ECHO LV INTERNAL DIMENSION SYSTOLIC: 3.66 CM
ECHO LV IVSD: 0.94 CM (ref 0.6–1)
ECHO LV MASS 2D: 40.2 G (ref 88–224)
ECHO LV MASS INDEX 2D: 19.8 G/M2 (ref 49–115)
ECHO LV POSTERIOR WALL DIASTOLIC: 0.74 CM (ref 0.6–1)
ECHO LV POSTERIOR WALL SYSTOLIC: 0 CM
ECHO LVOT DIAM: 2.07 CM
ECHO LVOT PEAK GRADIENT: 3.5 MMHG
ECHO LVOT PEAK VELOCITY: 93.16 CM/S
ECHO LVOT VTI: 14.69 CM
ECHO MV A VELOCITY: 85.98 CM/S
ECHO MV AREA PHT: 2.9 CM2
ECHO MV E DECELERATION TIME (DT): 262.9 MS
ECHO MV E VELOCITY: 47.19 CM/S
ECHO MV E/A RATIO: 0.55
ECHO MV E/E' LATERAL: 5.9
ECHO MV E/E' RATIO (AVERAGED): 7.67
ECHO MV E/E' SEPTAL: 9.44
ECHO MV PRESSURE HALF TIME (PHT): 76.2 MS
ECHO RA AREA 4C: 10.31 CM2
ECHO RA VOLUME: 21 ML
ECHO RV INTERNAL DIMENSION: 3.66 CM
ECHO TRICUSPID ANNULAR PEAK SYSTOLIC VELOCITY: 1.7 CM/S
ECHO TV A WAVE: 0 CM/S
ECHO TV E WAVE: 0 CM/S
ECHO TV REGURGITANT MAX VELOCITY: 222.7 CM/S
ECHO TV REGURGITANT PEAK GRADIENT: 19.8 MMHG
ERYTHROCYTE [DISTWIDTH] IN BLOOD BY AUTOMATED COUNT: 13.9 % (ref 11.6–14.5)
EST. AVERAGE GLUCOSE BLD GHB EST-MCNC: 123 MG/DL
GLUCOSE BLD STRIP.AUTO-MCNC: 131 MG/DL (ref 70–110)
GLUCOSE BLD STRIP.AUTO-MCNC: 146 MG/DL (ref 70–110)
GLUCOSE BLD STRIP.AUTO-MCNC: 92 MG/DL (ref 70–110)
GLUCOSE BLD STRIP.AUTO-MCNC: 95 MG/DL (ref 70–110)
GLUCOSE UR STRIP.AUTO-MCNC: NEGATIVE MG/DL
HBA1C MFR BLD: 5.9 % (ref 4.2–5.6)
HCT VFR BLD AUTO: 39.2 % (ref 36–48)
HDLC SERPL-MCNC: 53 MG/DL (ref 40–60)
HDLC SERPL: 3.8 {RATIO} (ref 0–5)
HDSCOM,HDSCOM: NORMAL
HGB BLD-MCNC: 12.4 G/DL (ref 13–16)
HGB UR QL STRIP: NEGATIVE
KETONES UR QL STRIP.AUTO: NEGATIVE MG/DL
LDLC SERPL CALC-MCNC: 113.6 MG/DL (ref 0–100)
LEUKOCYTE ESTERASE UR QL STRIP.AUTO: NEGATIVE
LIPID PROFILE,FLP: ABNORMAL
MAGNESIUM SERPL-MCNC: 1.9 MG/DL (ref 1.6–2.6)
MCH RBC QN AUTO: 28 PG (ref 24–34)
MCHC RBC AUTO-ENTMCNC: 31.6 G/DL (ref 31–37)
MCV RBC AUTO: 88.5 FL (ref 74–97)
METHADONE UR QL: NEGATIVE
NITRITE UR QL STRIP.AUTO: NEGATIVE
OPIATES UR QL: NEGATIVE
PCP UR QL: NEGATIVE
PH UR STRIP: 5 [PH] (ref 5–8)
PLATELET # BLD AUTO: 463 K/UL (ref 135–420)
PMV BLD AUTO: 10.3 FL (ref 9.2–11.8)
PROT UR STRIP-MCNC: NEGATIVE MG/DL
RBC # BLD AUTO: 4.43 M/UL (ref 4.7–5.5)
SP GR UR REFRACTOMETRY: 1.01 (ref 1–1.03)
TRIGL SERPL-MCNC: 162 MG/DL (ref ?–150)
TSH SERPL DL<=0.05 MIU/L-ACNC: 1.34 UIU/ML (ref 0.36–3.74)
UROBILINOGEN UR QL STRIP.AUTO: 0.2 EU/DL (ref 0.2–1)
VLDLC SERPL CALC-MCNC: 32.4 MG/DL
WBC # BLD AUTO: 10.1 K/UL (ref 4.6–13.2)

## 2019-08-22 PROCEDURE — 74011250636 HC RX REV CODE- 250/636: Performed by: HOSPITALIST

## 2019-08-22 PROCEDURE — 74011250637 HC RX REV CODE- 250/637: Performed by: HOSPITALIST

## 2019-08-22 PROCEDURE — 80061 LIPID PANEL: CPT

## 2019-08-22 PROCEDURE — 74011636637 HC RX REV CODE- 636/637: Performed by: HOSPITALIST

## 2019-08-22 PROCEDURE — 84443 ASSAY THYROID STIM HORMONE: CPT

## 2019-08-22 PROCEDURE — 97167 OT EVAL HIGH COMPLEX 60 MIN: CPT

## 2019-08-22 PROCEDURE — 85027 COMPLETE CBC AUTOMATED: CPT

## 2019-08-22 PROCEDURE — 36415 COLL VENOUS BLD VENIPUNCTURE: CPT

## 2019-08-22 PROCEDURE — 99218 HC RM OBSERVATION: CPT

## 2019-08-22 PROCEDURE — 82962 GLUCOSE BLOOD TEST: CPT

## 2019-08-22 PROCEDURE — 97535 SELF CARE MNGMENT TRAINING: CPT

## 2019-08-22 PROCEDURE — 83735 ASSAY OF MAGNESIUM: CPT

## 2019-08-22 PROCEDURE — 92610 EVALUATE SWALLOWING FUNCTION: CPT

## 2019-08-22 PROCEDURE — 74011250637 HC RX REV CODE- 250/637: Performed by: PSYCHIATRY & NEUROLOGY

## 2019-08-22 PROCEDURE — 83036 HEMOGLOBIN GLYCOSYLATED A1C: CPT

## 2019-08-22 PROCEDURE — 93306 TTE W/DOPPLER COMPLETE: CPT

## 2019-08-22 RX ORDER — ASPIRIN 325 MG
325 TABLET, DELAYED RELEASE (ENTERIC COATED) ORAL DAILY
Status: DISCONTINUED | OUTPATIENT
Start: 2019-08-23 | End: 2019-08-23 | Stop reason: HOSPADM

## 2019-08-22 RX ORDER — SODIUM CHLORIDE 9 MG/ML
15 INJECTION INTRAMUSCULAR; INTRAVENOUS; SUBCUTANEOUS ONCE
Status: DISPENSED | OUTPATIENT
Start: 2019-08-22 | End: 2019-08-22

## 2019-08-22 RX ORDER — ATORVASTATIN CALCIUM 20 MG/1
40 TABLET, FILM COATED ORAL DAILY
Status: DISCONTINUED | OUTPATIENT
Start: 2019-08-22 | End: 2019-08-23 | Stop reason: HOSPADM

## 2019-08-22 RX ADMIN — INSULIN GLARGINE 10 UNITS: 100 INJECTION, SOLUTION SUBCUTANEOUS at 22:00

## 2019-08-22 RX ADMIN — ATORVASTATIN CALCIUM 40 MG: 20 TABLET, FILM COATED ORAL at 11:44

## 2019-08-22 RX ADMIN — Medication 10 ML: at 14:00

## 2019-08-22 RX ADMIN — HEPARIN SODIUM 5000 UNITS: 5000 INJECTION INTRAVENOUS; SUBCUTANEOUS at 06:07

## 2019-08-22 RX ADMIN — AMLODIPINE BESYLATE 10 MG: 5 TABLET ORAL at 11:17

## 2019-08-22 RX ADMIN — FAMOTIDINE 20 MG: 10 INJECTION, SOLUTION INTRAVENOUS at 11:18

## 2019-08-22 RX ADMIN — HEPARIN SODIUM 5000 UNITS: 5000 INJECTION INTRAVENOUS; SUBCUTANEOUS at 22:02

## 2019-08-22 RX ADMIN — Medication 10 ML: at 22:01

## 2019-08-22 RX ADMIN — HEPARIN SODIUM 5000 UNITS: 5000 INJECTION INTRAVENOUS; SUBCUTANEOUS at 14:37

## 2019-08-22 RX ADMIN — SODIUM CHLORIDE 100 ML/HR: 900 INJECTION, SOLUTION INTRAVENOUS at 06:07

## 2019-08-22 RX ADMIN — HYDROCHLOROTHIAZIDE 25 MG: 25 TABLET ORAL at 11:18

## 2019-08-22 RX ADMIN — FAMOTIDINE 20 MG: 10 INJECTION, SOLUTION INTRAVENOUS at 22:01

## 2019-08-22 NOTE — CONSULTS
NEUROLOGY CONSULTATION NOTE    Patient: Ubaldo Carrillo MRN: [id number]  CSN: [id number]    YOB: 1950  Age: 77 y.o. Sex: male    DOA: 8/21/2019 LOS:  LOS: 1 day        Requesting Physician: Dr. Nona Aranda  Reason for Consultation: TIA               HISTORY OF PRESENT ILLNESS:   Ubaldo Carrillo is a 77 y.o. male who hx of DM, HTN, OA,smoker came to ER for dizziness. Patient was sitting at the porch and smoking yesterday, and felt severe lightheadedness and generalized weakness upon standing. Patient was sent to emergency room, he was found to have left facial droop. Code S was called, patient was evaluated by tele-neurologist. Patient with suspected to have ischemic stroke, but not TPA candidate. Patient denies headache, vision loss. His symptom is resolved after about six hours. He is a smoker, he takes baby aspirin at home. Stroke Work-up:  Brain MRI: Result Date: 8/21/2019    Impression: 1. No acute hemorrhage or acute infarction. 2. Atrophy as described with white matter abnormality that is nonspecific but likely ischemic. 3. No other significant intracranial abnormality is appreciated. Ct Head Wo Cont Result Date: 8/21/2019    IMPRESSION: 1. No CT findings of an acute intracranial abnormality. Please note that noncontrast head CT may be normal in early acute infarct. Stable noncontrast CT scan of the head but no significant change. 2. Diffuse cerebral volume loss. Code S report provided to ordering physician Tarry Holstein M.D. at [phone] on 8/21/2019, with confirmatory verbal response. Cta Head Neck W Wo Cont Result Date: 8/22/2019    IMPRESSION: 1. 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 with possible segmental occlusion on the right. 2.  Mild to moderate stenoses along the proximal and mid portions of the basilar artery. 3.  Mild plaque involving the carotid bulbs but with 0% stenosis by NASCET criteria. 4.  Mild to moderate stenoses involving the carotid siphons. 5.  Mild to moderate stenosis along the V1 segment of the dominant right vertebral artery with mild stenoses on the left. Critical results pertaining to the absence of an acute large vessel occlusion, as per code S protocol, were communicated to the ordering physician by the radiologist at the time of preliminary interpretation (3:30 PM on 8/21/2019). Stenosis Key: Mild-less than 25% Mild to moderate- 25-50% Moderate- 40-60% Moderate to severe- 50-75% Severe-greater than 75%     Xr Chest Port    Result Date: 8/21/2019  EXAM: One-view chest CLINICAL HISTORY: facial droop , COMPARISON: None FINDINGS: Frontal view of the chest demonstrate clear lungs. Cardiac silhouette is normal in size and contour. No acute bony or soft tissue abnormality. IMPRESSION: No acute pulmonary process identified. Echocardiogram: pending  Lipid panel: No results found for: CHOL, CHOLPOCT, CHOLX, CHLST, CHOLV, 999691, HDL, LDL, LDLC, DLDLP, [id number], VLDLC, VLDL, TGLX, TRIGL, TRIGP, TGLPOCT, CHHD, CHHDX  HbA1c:   Lab Results   Component Value Date/Time    Hemoglobin A1c 5.9 (H) 08/22/2019 06:20 AM       PAST MEDICAL HISTORY:  Past Medical History:   Diagnosis Date    Peptic ulcer disease      PAST SURGICAL HISTORY:  Past Surgical History:   Procedure Laterality Date    HX ORTHOPAEDIC      knee surgery     FAMILY HISTORY:  History reviewed. No pertinent family history.   SOCIAL HISTORY:  Social History     Socioeconomic History    Marital status: COMMON LAW     Spouse name: Not on file    Number of children: Not on file    Years of education: Not on file    Highest education level: Not on file   Tobacco Use    Smoking status: Former Smoker    Smokeless tobacco: Never Used   Substance and Sexual Activity    Alcohol use: No    Drug use: No     MEDICATIONS:  Current Facility-Administered Medications   Medication Dose Route Frequency    0.9% NaCl bacteriostatic (NORMAL SALINE) 0.9 % injection 15 mL  15 mL IntraVENous ONCE    perflutren lipid microspheres (DEFINITY) in NS bolus IV  1 mL IntraVENous RAD ONCE    0.9% sodium chloride infusion  100 mL/hr IntraVENous CONTINUOUS    sodium chloride (NS) flush 5-40 mL  5-40 mL IntraVENous Q8H    sodium chloride (NS) flush 5-40 mL  5-40 mL IntraVENous PRN    acetaminophen (TYLENOL) tablet 650 mg  650 mg Oral Q4H PRN    labetalol (NORMODYNE;TRANDATE) 20 mg/4 mL (5 mg/mL) injection 5 mg  5 mg IntraVENous Q10MIN PRN    heparin (porcine) injection 5,000 Units  5,000 Units SubCUTAneous Q8H    famotidine (PF) (PEPCID) 20 mg in sodium chloride 0.9% 10 mL injection  20 mg IntraVENous Q12H    insulin lispro (HUMALOG) injection   SubCUTAneous AC&HS    glucose chewable tablet 16 g  4 Tab Oral PRN    glucagon (GLUCAGEN) injection 1 mg  1 mg IntraMUSCular PRN    insulin glargine (LANTUS) injection 10 Units  10 Units SubCUTAneous QHS    nicotine (NICODERM CQ) 14 mg/24 hr patch 1 Patch  1 Patch TransDERmal DAILY    aspirin delayed-release tablet 81 mg  81 mg Oral DAILY    amLODIPine (NORVASC) tablet 10 mg  10 mg Oral DAILY    hydroCHLOROthiazide (HYDRODIURIL) tablet 25 mg  25 mg Oral DAILY     Prior to Admission medications    Medication Sig Start Date End Date Taking? Authorizing Provider   chlorproMAZINE (THORAZINE) 50 mg tablet Take 1 Tab by mouth two (2) times daily as needed (hiccups). 6/22/19  Yes Regis Castanon MD   amLODIPine (NORVASC) 10 mg tablet Take 10 mg by mouth daily. Yes Tyler, MD Domenico   metFORMIN (GLUCOPHAGE) 500 mg tablet Take 500 mg by mouth two (2) times daily (with meals). Yes Tyler, MD Domenico   hydroCHLOROthiazide (HYDRODIURIL) 25 mg tablet Take 25 mg by mouth daily. Yes Domenico Scott MD   metoclopramide HCl (REGLAN) 10 mg tablet Take 10 mg by mouth Before breakfast, lunch, dinner and at bedtime. Yes Tyler, MD Domenico   needles, insulin disposable (INSULIN PEN NEEDLE) by Does Not Apply route.    Yes Domenico Scott MD   raNITIdine (ZANTAC) 150 mg tablet Take 150 mg by mouth two (2) times a day. Yes Other, MD Domenico       ALLERGIES:  Allergies   Allergen Reactions    Sulfa (Sulfonamide Antibiotics) Unknown (comments)       Review of Systems  GENERAL: No fevers or chills. HEENT: No change in vision, earache, tinnitus, sore throat or sinus congestion. NECK: No pain or stiffness. CARDIOVASCULAR: No chest pain or pressure. No palpitations. PULMONARY: No shortness of breath, cough or wheeze. GASTROINTESTINAL: No abdominal pain, nausea, vomiting or diarrhea. GENITOURINARY: No urinary frequency, urgency, hesitancy or dysuria. MUSCULOSKELETAL: No joint or muscle pain, no back pain, no recent trauma. DERMATOLOGIC: No rash, no itching, no lesions. ENDOCRINE: No polyuria, polydipsia, no heat or cold intolerance. No recent change in weight. HEMATOLOGICAL: No anemia or easy bruising or bleeding. NEUROLOGIC: No headache, seizures, numbness, tingling or weakness. PHYSICAL EXAMINATION:     Visit Vitals  /64   Pulse 80   Temp 98.5 °F (36.9 °C)   Resp 18   Ht 6' (1.829 m)   Wt 80.7 kg (178 lb)   SpO2 100%   BMI 24.14 kg/m²      O2 Device: Room air  GENERAL: Pleasant, in no apparent distress. HEENT: Moist mucous membranes, sclerae anicteric, scalp is atraumatic. CVS: Regular rate and rhythm, no murmurs or gallops. No carotid bruits. PULMONARY: Clear to auscultation bilaterally. No rales or rhonchi. No wheezing. EXTREMITIES: Normal range of motion at all sites. No deformities. ABDOMEN: Soft, nontender. SKIN: No rashes or ecchymoses. Warm and dry. NEUROLOGIC: Alert and oriented x3. Speech is fluent without any aphasia or dysarthria. Cranial nerves: Face is symmetric with symmetric smile. Facial sensation is intact. Extraocular movements are intact with no nystagmus. Visual fields are full to confrontation. PERRL. Tongue is midline. Palate elevates symmetrically. Hearing intact to speech. Sternocleidomastoid and trapezius strengths are full bilaterally. Motor: Normal tone and normal bulk on all four extremities. Strength is full on all four segmentally. There is no pronator drift or orbiting. Sensory: Intact to pinprick and touch on all four. Normal vibratory sensation on toes bilaterally. Coordination: Intact coordination with finger-nose-finger bilaterally. Normal fine movements. No bradykinesia detected. Deep tendon reflexes: 2+ at biceps, brachioradialis, patella and ankles bilaterally. Toes are down-going bilaterally. Gait assessment: Able to stand and walk with no difficulty. Able to perform tandem gait with no difficulty. Labs: Results:       Chemistry Recent Labs     08/21/19  1155   *      K 3.4*      CO2 31   BUN 19*   CREA 0.97   CA 9.2   AGAP 6   BUCR 20   AP 77   TP 6.8   ALB 3.2*   GLOB 3.6   AGRAT 0.9      CBC w/Diff Recent Labs     08/22/19  0620 08/21/19  1155   WBC 10.1 11.1   RBC 4.43* 4.55*   HGB 12.4* 13.0   HCT 39.2 40.5   * 437*   GRANS  --  65   LYMPH  --  25   EOS  --  1      Cardiac Enzymes No results for input(s): CPK, CKND1, AMY in the last 72 hours. No lab exists for component: CKRMB, TROIP   Coagulation No results for input(s): PTP, INR, APTT in the last 72 hours. No lab exists for component: INREXT    Lipid Panel No results found for: CHOL, CHOLPOCT, CHOLX, CHLST, CHOLV, 838376, HDL, LDL, LDLC, DLDLP, 221238, VLDLC, VLDL, TGLX, TRIGL, TRIGP, TGLPOCT, CHHD, CHHDX   BNP No results for input(s): BNPP in the last 72 hours. Liver Enzymes Recent Labs     08/21/19  1155   TP 6.8   ALB 3.2*   AP 77   SGOT 17      Thyroid Studies No results found for: T4, T3U, TSH, TSHEXT       Radiology:  Mri Brain Wo Cont    Result Date: 8/21/2019  Brain MR without contrast: Indication: Sudden onset dizziness. Slight left sided facial weakness. Procedure: Sagittal spin echo T1, axial FSE FLAIR and T2 and axial diffusion weighted scanning was performed.   An axial T2* scan optimized to detect hemosiderin and calcium was performed. Coronal spin echo T1and FSE T2 scans were also performed. No contrast was administered. Comparison exam: Head CT 8/21/2019 Findings: Diffusion: There is a small area of subcortical lateral right occipital high signal diffusion felt to be T2 shine through. No convincing areas of restricted diffusion would be consistent with an acute infarction. The T2* scan shows no acute or chronic hemorrhage or abnormal calcifications. Brain parenchyma: Confluent ischemic changes immediate periventricular white matter. 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 Multifocal patchy areas of increased signal in the periventricular, deep and peripheral subcortical white matter of the hemispheres. There is patchy ischemic changes in the jose. No mass or mass effect. Ventricles and sulci: There is moderate cortical atrophy most prominent high at the vertex given age. Additional cerebellar hemispheric atrophy is noted. No definite brainstem atrophy. Extra axial: No extra axial fluid collection or mass. Brain vasculature: Normal, no arterial vascular abnormality is noted. Craniocervical Junction: Normal. Extracranial and skull base:  Bilateral maxillary sinus polyp/retention cysts. Impression: 1. No acute hemorrhage or acute infarction. 2. Atrophy as described with white matter abnormality that is nonspecific but likely ischemic. 3. No other significant intracranial abnormality is appreciated. Ct Head Wo Cont    Result Date: 8/21/2019  EXAM: CT Head INDICATION: Dizziness and facial droop   > Additional: Code stroke. COMPARISON: 02/05/2017. TECHNIQUE: Axial CT imaging of the head was performed without intravenous contrast. One or more dose reduction techniques were used on this CT: automated exposure control, adjustment of the mAs and/or kVp according to patient size, and iterative reconstruction techniques. The specific techniques used on this CT exam have been documented in the patient's electronic medical record. Digital Imaging and Communications in Medicine (DICOM) format image data are available to nonaffiliated external healthcare facilities or entities on a secure, media free, reciprocally searchable basis with patient authorization for at least a 12-month period after this study. _______________ FINDINGS: BRAIN:   > Brain volume: Diffuse cortical accentuation of the sulcal gyral pattern, sylvian fissures and cerebellar folia   > White matter: Little or no white matter disease. > Infarcts, encephalomalacia: None.   > Parenchymal mass: None.   > Parenchymal hemorrhage: None.   > Midline shift: None.   > Miscellaneous: None. EXTRA-AXIAL SPACES: Unremarkable. No fluid collections. CALVARIUM: Intact. SINUSES, MASTOIDS: Clear. OTHER EXTRACRANIAL: Unremarkable. _______________     IMPRESSION: 1. No CT findings of an acute intracranial abnormality. Please note that noncontrast head CT may be normal in early acute infarct. Stable noncontrast CT scan of the head but no significant change. 2. Diffuse cerebral volume loss. Code S report provided to ordering physician Estelita Osorio M.D. at [phone] on 8/21/2019, with confirmatory verbal response. Cta Head Neck W Wo Cont    Result Date: 8/22/2019  CTA head and CTA neck with contrast 8/21/2019. CLINICAL INDICATION/HISTORY:   Stroke alert. History of hypertension and diabetes. Presentation with diffuse weakness. COMPARISON:   CT scan of the head and MRI of the brain from 8/21/2019. TECHNIQUE: Multiple axial CT images of the neck were obtained extending from the level of the aortic arch to the skull base during the dynamic infusion 100 cc of [id number]-370 utilizing a CTA protocol. Multiple axial CT images of the head were obtained extending from below the level of the skull base to the vertex after the administration of the IV contrast utilizing a CTA protocol. Multiplanar reconstructions were obtained, including MIP reconstructions as well as curved planar reformats. Multiple additional 3-D surface rendering reformations were performed at a separate workstation. Dose reduction techniques:  Automated exposure control, mAs and/or kVp reductions based on patient size, and iterative reconstruction. The specific techniques utilized on this CT exam have been documented in the patient's electronic medical record. Digital imaging and communications and medicine (DICOM) format image data are available to nonaffiliated external healthcare facilities or entities on a secure, media free, reciprocally searchable basis with patient authorization for at least a 12 month period after this study. FINDINGS: GREAT VESSEL ORIGINS:   Atheromatous plaque is noted throughout the aortic arch. Mild plaque is present at the origins of the great vessels but there does not appear to be significant stenosis. The origin of the right common carotid artery from the innominate artery is patent. CERVICAL VASCULATURE:   Both common carotid arteries are patent throughout their thoracic and cervical segments. There is heterogeneous plaque involving both carotid bifurcations. However, there is 0% stenosis by NASCET criteria. There is also focal heterogeneous plaque in the distal cervical segment on the left with mild stenosis. Both internal carotid arteries are otherwise patent throughout their cervical segments. Right vertebral artery dominance is noted. There is focal heterogeneous plaque at the origin of the nondominant left vertebral artery with mild stenosis. Minimal narrowing of the right vertebral artery origin is present. There is heterogeneous plaque along the right V1 segment with mild to moderate stenosis. There is also focal calcific plaque along the proximal and mid V2 segments of the left vertebral artery with mild stenoses.   Both vertebral arteries are otherwise patent throughout their cervical segments. INTRACRANIAL VASCULATURE:   Heterogeneous plaque is noted involving both carotid siphons with mild to moderate areas of stenosis. There is also heterogeneous plaque along the V4 segments of both vertebral arteries. There is very severe stenosis or segmental occlusion along the right V4 segment and there is also severe stenosis of the left V4 segment. The distal V4 segments remain patent and the vertebral basilar junction is intact. The basilar artery is diminutive in size with mild to moderate stenoses along its proximal and mid portions. Mild to moderate stenoses are suggested along the P2 segments of both posterior cerebral arteries. There is no evidence of hemodynamically significant stenosis or occlusion involving the remainder of the main intracranial branches. There is no evidence of aneurysm formation or underlying vascular malformation. OTHER:   Centrilobular emphysematous changes are noted in the upper lung zones. IMPRESSION: 1. 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 with possible segmental occlusion on the right. 2.  Mild to moderate stenoses along the proximal and mid portions of the basilar artery. 3.  Mild plaque involving the carotid bulbs but with 0% stenosis by NASCET criteria. 4.  Mild to moderate stenoses involving the carotid siphons. 5.  Mild to moderate stenosis along the V1 segment of the dominant right vertebral artery with mild stenoses on the left. Critical results pertaining to the absence of an acute large vessel occlusion, as per code S protocol, were communicated to the ordering physician by the radiologist at the time of preliminary interpretation (3:30 PM on 8/21/2019).  Stenosis Key: Mild-less than 25% Mild to moderate- 25-50% Moderate- 40-60% Moderate to severe- 50-75% Severe-greater than 75%     Xr Chest Port    Result Date: 8/21/2019  EXAM: One-view chest CLINICAL HISTORY: facial droop , COMPARISON: None FINDINGS: Frontal view of the chest demonstrate clear lungs. Cardiac silhouette is normal in size and contour. No acute bony or soft tissue abnormality. IMPRESSION: No acute pulmonary process identified. ASSESSMENT/IMPRESSION:  60-year-old male presents with lightheadedness and left facial weakness, symptoms are resolved after six hours. Stroke workups are unremarkable, except cerebral atherosclerosis with severe stenoses involving the V4 segments of both vertebral arteries with possible segmental occlusion on the right. Risk factor including smoking, hypertension and diabetes. 1. TIA  2. Smoking  3. Cerebral atherosclerosis  4. osteoarthritis  RECOMMENDATIONS:  1. Increase aspirin to 325 mg daily. Start atorvastatin 40 mg daily. 2. Smoke cessation. 3. Pending TTE and lipid profile.   4. BP< 140/90, LDL <100, HbA1C< 7%.  5.   Follow up in the clinic after two months.          ------------------------------------  Negin Johnson MD  8/22/2019  10:28 AM

## 2019-08-22 NOTE — PROGRESS NOTES
Virtual reviewer communicated change to CM which reflect outpatient observation order written prior to discharge order being written. Code 44 delivered to patient. CM met with the patient and provided to the patient the printed information about her outpatient observation status. The patient was given the flyer entitled, \"Medicare Outpatient Observation: Information & notification. \" All questions were answered, no additional discharge needs identified at this time and patient expects to return to their (home, assisted living facility, relatives home, etc.) after discharge today.

## 2019-08-22 NOTE — PROGRESS NOTES
2 attempts to treat pt. 1st pt just finished working with OT, 2nd pt refusing wanting to nap. Will follow up tomorrow.

## 2019-08-22 NOTE — PROGRESS NOTES
Hospitalist Progress Note-critical care note     Patient: Gibson Raza MRN: [id number]  CSN: [id number]    YOB: 1950  Age: 77 y.o. Sex: male    DOA: 8/21/2019 LOS:  LOS: 1 day            Chief complaint: cerebral atherosclerosis. Tia. Htn, dm oa      Assessment/Plan         Hospital Problems  Never Reviewed          Codes Class Noted POA    Cerebral atherosclerosis ICD-10-CM: I67.2  ICD-9-CM: 437.0  8/22/2019 Unknown        * (Principal) TIA (transient ischemic attack) ICD-10-CM: G45.9  ICD-9-CM: 435.9  8/21/2019         Smoker ICD-10-CM: K07.473  ICD-9-CM: 305.1  8/21/2019 Unknown        HTN (hypertension) ICD-10-CM: I10  ICD-9-CM: 401.9  8/21/2019 Unknown        DM w/o complication type II ([facility] [address] ICD-10-CM: E11.9  ICD-9-CM: 250.00  8/21/2019 Unknown        OA (osteoarthritis) ICD-10-CM: M19.90  ICD-9-CM: 715.90  8/21/2019 Unknown        Hypokalemia ICD-10-CM: E87.6  ICD-9-CM: 276.8  8/21/2019 Unknown              tia   Mri no stroke   Continue aspirin 325 mg per neuro and  lipitor   Case discussed with Dr. Nilsa Barraza neuro check  Need stop smoking   cta :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 with  possible segmental occlusion on the right  High risk for stroke   Echo possible shunt -need corry to confirm-will consult cardiologist            DM type II , with complication,  -long term insulin , Complication with cva  -on lantus, ssi, diabetic diet , hypoglycemia protocol        HTN, accelerated  Continue home medication.     Smoker  Need education. Nicotine patch      OA : tylenol prn      Hypokalemia   K replacement     Echo results discussed with pt and he indicated a verbal understanding       Review of systems:    General: No fevers or chills. Cardiovascular: No chest pain or pressure. No palpitations. Pulmonary: No shortness of breath. Gastrointestinal: No nausea, vomiting.      Vital signs/Intake and Output:  Visit Vitals  /67 (BP 1 Location: Left arm, BP Patient Position: At rest;Sitting)   Pulse 86   Temp 98.6 °F (37 °C)   Resp 18   Ht 6' (1.829 m)   Wt 80.7 kg (178 lb)   SpO2 100%   BMI 24.14 kg/m²     Current Shift:  08/22 0701 - 08/22 1900  In: 240 [P.O.:240]  Out: 200 [Urine:200]  Last three shifts:  08/20 1901 - 08/22 0700  In: 1528.3 [P.O.:360; I.V.:1168.3]  Out: 825 [Urine:825]    Physical Exam:  General: WD, WN. Alert, cooperative, no acute distress    HEENT: NC, Atraumatic. PERRLA, anicteric sclerae. Lungs: CTA Bilaterally. No Wheezing/Rhonchi/Rales. Heart:  Regular  rhythm,  No murmur, No Rubs, No Gallops  Abdomen: Soft, Non distended, Non tender.  +Bowel sounds,   Extremities: No c/c/e  Psych:   Not anxious or agitated. Neurologic:  No acute neurological deficit. Labs: Results:       Chemistry Recent Labs     08/21/19  1155   *      K 3.4*      CO2 31   BUN 19*   CREA 0.97   CA 9.2   AGAP 6   BUCR 20   AP 77   TP 6.8   ALB 3.2*   GLOB 3.6   AGRAT 0.9      CBC w/Diff Recent Labs     08/22/19  0620 08/21/19  1155   WBC 10.1 11.1   RBC 4.43* 4.55*   HGB 12.4* 13.0   HCT 39.2 40.5   * 437*   GRANS  --  65   LYMPH  --  25   EOS  --  1      Cardiac Enzymes No results for input(s): CPK, CKND1, AMY in the last 72 hours. No lab exists for component: CKRMB, TROIP   Coagulation No results for input(s): PTP, INR, APTT in the last 72 hours. No lab exists for component: INREXT    Lipid Panel Lab Results   Component Value Date/Time    Cholesterol, total 199 08/22/2019 06:20 AM    HDL Cholesterol 53 08/22/2019 06:20 AM    LDL, calculated 113.6 (H) 08/22/2019 06:20 AM    VLDL, calculated 32.4 08/22/2019 06:20 AM    Triglyceride 162 (H) 08/22/2019 06:20 AM    CHOL/HDL Ratio 3.8 08/22/2019 06:20 AM      BNP No results for input(s): BNPP in the last 72 hours.    Liver Enzymes Recent Labs     08/21/19  1155   TP 6.8   ALB 3.2*   AP 77   SGOT 17      Thyroid Studies Lab Results   Component Value Date/Time    TSH 1.34 08/22/2019 06:20 AM        Procedures/imaging: see electronic medical records for all procedures/Xrays and details which were not copied into this note but were reviewed prior to creation of Natalia Quarles MD

## 2019-08-22 NOTE — PROGRESS NOTES
Bedside and Verbal shift change report given to RACHEAL Dwyer Rn (oncoming nurse) by [employer] Jieyin (offgoing nurse). Report included the following information SBAR, Kardex, Intake/Output, MAR, Recent Results and Cardiac Rhythm NSR.

## 2019-08-22 NOTE — PROGRESS NOTES
Problem: Self Care Deficits Care Plan (Adult)  Goal: *Acute Goals and Plan of Care (Insert Text)  Description  Initial Occupational Therapy Goals (8/22/2019) Within 7 day(s):    1. Patient will perform grooming standing at sink with Supervision x 2-3 minutes for increased independence with ADLs. 2. Patient will perform UB dressing with setup for increased independence with ADLs. 3. Patient will perform LB dressing with setup & A/E PRN for increased independence with ADLs. 4. Patient will perform all aspects of toileting with Supervision for increased independence in ADLs  5. Patient will independently apply energy conservation techniques with 1 verbal cue(s) for increased independence with ADLs. 6. Patient will utilize good body mechanics during ADLs with 1 verbal cue(s). Outcome: Resolved/Met     OCCUPATIONAL THERAPY EVALUATION/DISCHARGE    Patient: Miracle Gates (78 y.o. male)  Date: 8/22/2019  Primary Diagnosis: Stroke [facility]) [I63.9]  TIA (transient ischemic attack) [G45.9]        Precautions: Fall, Skin  PLOF: Patient was grossly independent, but contends that he had been put on Rx for joint pain, but recently taken off. ASSESSMENT :  Based on the objective data described below, the patient presents with decreased functional mobility d/t joint pain. Pt w/ decreased recall of MD names and Rx, but contends that he was doing well with a medication, but had to be taken off of it and needs to follow up for possible TKAs. Pt reports generalized weakness and joint pain. 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 Education on stroke d/t multiple risk factors. Education: Reviewed home safety, body mechanics, signs and symptoms of a stroke, risk factors, blood sugar management in relation to stroke, and adaptive dressing techniques with patient verbalizing understanding at this time.  Stroke Education: Patient educated on signs/symptoms of stroke and importance of early intervention. Patient verbalized understanding. Skilled Occupational Therapy is not indicated at this time. Discharge Recommendations: Home Health and Outpatient  Further Equipment Recommendations for Discharge: To Be Determined (TBD) at next level of care (?Tub Transfer Bench?)      SUBJECTIVE:   Patient stated I messed this one up in [place]. (re: R knee)    OBJECTIVE DATA SUMMARY:     Past Medical History:   Diagnosis Date    Peptic ulcer disease      Past Surgical History:   Procedure Laterality Date    HX ORTHOPAEDIC      knee surgery     Barriers to Learning/Limitations: yes;  physical  Compensate with: visual, verbal, tactile, kinesthetic cues/model    Prior Level of Function/Home Situation:   Home Situation  Home Environment: Private residence  # Steps to Enter: 1  One/Two Story Residence: Two story  # of Interior Steps: 8  Living Alone: No  Support Systems: Spouse/Significant Other/Partner & son  Patient Expects to be Discharged to[de-identified] Private residence  Current DME Used/Available at Home: None  Tub or Shower Type: Tub/Shower combination  ? Right hand dominant   ? Left hand dominant    Cognitive/Behavioral Status:  Neurologic State: Alert  Orientation Level: Oriented X4  Cognition: Follows commands;Memory loss  Safety/Judgement: Awareness of environment; Insight into deficits    Skin: dry, but grossly intact  Edema: No significant edema noted     Vision/Perceptual:    Tracking: Requires cues, head turns, or add eye shifts to track    Diplopia: No    Acuity: Within Defined Limits      Coordination: BUE  Coordination: Grossly decreased, non-functional  Fine Motor Skills-Upper: Left Intact; Right Intact    Gross Motor Skills-Upper: Left Intact; Right Intact    Balance:  Sitting: Intact  Standing: Intact; With support    Strength: BUE  Strength: Generally decreased, functional    Tone & Sensation: BUE  Tone: Normal  Sensation: Intact    Range of Motion: BUE  AROM: Generally decreased, functional  PROM: Generally decreased, functional    Functional Mobility and Transfers for ADLs:  Bed Mobility:  Supine to Sit: Supervision  Sit to Supine: Supervision  Scooting: Supervision  Transfers:  Sit to Stand: Supervision; Adaptive equipment  Bed to Chair: Supervision; Adaptive equipment   Toilet Transfer : Supervision; Adaptive equipment   Bathroom Mobility: Supervision/set up    ADL Assessment:  Feeding: Setup    Oral Facial Hygiene/Grooming: Supervision(standing sinkside)    Bathing: Setup; Additional time    Upper Body Dressing: Setup    Lower Body Dressing: Setup; Additional time    Toileting: Supervision    ADL Intervention:  Feeding  Drink to Mouth: Set-up    Grooming  Washing Hands: Supervision(standing sinkside)    Lower Body Dressing Assistance  Socks: Set-up; Compensatory technique training(flexed forward)  Position Performed: Seated edge of bed  Pt w/ adequate hip ROM for side sitting technique and able to complete on RLE, but returned to full forward flexion for LLE (doffed socks utilizing forward flexion)    Cognitive Retraining  Problem Solving: Inductive reason; Identifying the task; Identifying the problem;General alternative solution;Deductive reason; Awareness of environment  Executive Functions: Executing cognitive plans  Organizing/Sequencing: Breaking task down;Prioritizing  Safety/Judgement: Awareness of environment; Insight into deficits    Neuromuscular Re-Education:  Standing ADLs w/ emphasis on balance    Pain:  Pain level Pre-treatment: 3/10  Pain level Post-treatment: 3/10  Pain Intervention(s): Medication administer by RN (see MAR); Rest, Repositioning  Response to Intervention: Nurse notified, see doc flowsheet    Activity Tolerance:   Fair. Patient able to stand 2-3 minute(s). Patient able to complete ADLs with intermittent rest breaks. Patient limited by joint pain, ROM, strength, balance.  Patient unsteady     Please refer to the flowsheet for vital signs taken during this treatment. After treatment:   ? Patient left in no apparent distress sitting up in chair  ? Patient left in no apparent distress in bed/sitting EOB  ? Call bell left within reach  ? Nursing notified  ? Caregiver present  ? Bed alarm activated    COMMUNICATION/EDUCATION:   ? Home safety education was provided and the patient/caregiver indicated understanding. ? Patient/family have participated as able in goal setting and plan of care. ? Patient/family agree to work toward stated goals and plan of care. ? Patient understands intent and goals of therapy, but is neutral about his/her participation. ? Patient is unable to participate in goal setting and plan of care. Thank you for this referral.  Jenna Mendosa, OTR/L  Time Calculation: 23 mins    Eval Complexity: History: HIGH Complexity : Extensive review of history including physical, cognitive and psychosocial history ; Examination: HIGH Complexity : 5 or more performance deficits relating to physical, cognitive , or psychosocial skils that result in activity limitations and / or participation restrictions; Decision Making:HIGH Complexity : Patient presents with comorbidities that affect occupational performance.  Signifigant modification of tasks or assistance (eg, physical or verbal) with assessment (s) is necessary to enable patient to complete evaluation

## 2019-08-22 NOTE — ROUTINE PROCESS
Bedside and Verbal shift change report given to Malu Brenner RN (oncoming nurse) by Fish Newman RN (offgoing nurse). Report included the following information SBAR and Kardex.

## 2019-08-22 NOTE — PROGRESS NOTES
.Reason for Admission:   11:18 AM  Derik Ross is a 77 y.o. male with PMHX of hypertension and diabetes who presents to the emergency department C/O this with associated sweating. Sleep. He also feels diffusely weak as well. Denies chest pain shortness of breath nausea vomiting or any other acute symptoms. His blood sugar upon arrival with EMS was 150. RRAT Score:     16             Do you (patient/family) have any concerns for transition/discharge? no              Plan for utilizing home health:   no    Current Advanced Directive/Advance Care Plan:  plese consider placing a referral for palliative care of [place] to address ACP            Transition of Care Plan:    Met wit patient and wife at bedside along with Dr. Omer Stone. Patent lives with his torrey redman son. Patient has medicare for insurance. Patient has Dr. Hector Gallego at [facility] for pcp. Patient reports his symptoms have resolved. Patient denies needs. PT recommended out patient he will follow up with pcp  Care Management Interventions  PCP Verified by CM:  Yes  Transition of Care Consult (CM Consult): Discharge Planning  Current Support Network: Lives with Spouse  Confirm Follow Up Transport: Family  Plan discussed with Pt/Family/Caregiver: Yes  Freedom of Choice Offered: Yes  Discharge Location  Discharge Placement: Home with family assistance

## 2019-08-22 NOTE — PROGRESS NOTES
Problem: Dysphagia (Adult)  Goal: *Acute Goals and Plan of Care (Insert Text)  Description  Dysphagia Present:   No    Recommendations:  Diet: Regular/thin  Meds: Per patient preference    Patient will:  1. Participate in training and education related to continued aspiration risk, diet recs and compensatory strategies (goal met). Outcome: Resolved/Met    SPEECH LANGUAGE PATHOLOGY BEDSIDE SWALLOW EVALUATION AND DISCHARGE    Patient: Rigoberto Quintana (78 y.o. male)  Date: 8/22/2019  Primary Diagnosis: Stroke ([facility] [address] [I63.9]  TIA (transient ischemic attack) [G45.9]        Precautions: None     PLOF: Independent    ASSESSMENT :  Clinical beside swallow eval completed per MD orders. Pt A&Ox4. Functional communication. Intelligibility >90%. Cognitive-linguistic function appears intact. OM examination revealed oral motor structures functional for mastication and deglutition. Presented with thin liquid, puree, and solid trials. Exhibited + bolus cohesion, manipulation and A-P transit. Further exhibited + swallow timing/reflex and hyolaryngeal excursion. Pt able to manipulate and clear with 0 clinical s/s aspiration and/or oropharyngeal dysphagia. Pt safe for regular solid, thin liquid diet. 0 formal ST needs for dysphagia indicated at this time. SLP educated pt on role of speech therapist in current setting with re: speech/swallow; verbalized comprehension. SLP available for re-evaluation if indicated by MD.     Thank you for this referral.   Tico Marroquin, SLP     PLAN :  Recommendations and Planned Interventions:  No formal ST needs ID'd for dysphagia. Eval only. Discharge Recommendations: None     SUBJECTIVE:   Patient stated I have pain in my joints but that's it.     OBJECTIVE:     Past Medical History:   Diagnosis Date    Peptic ulcer disease      Past Surgical History:   Procedure Laterality Date    HX ORTHOPAEDIC      knee surgery     Home Situation:   Home Situation  Home Environment: Private residence  # Steps to Enter: 1  One/Two Story Residence: Two story  # of Interior Steps: 8  Living Alone: No  Support Systems: Spouse/Significant Other/Partner  Patient Expects to be Discharged to[de-identified] Private residence  Current DME Used/Available at Home: None    Diet prior to admission: Regular/thin  Current Diet:  Regular/thin     Cognitive and Communication Status:  Neurologic State: Alert  Orientation Level: Oriented X4  Cognition: Appropriate decision making, Appropriate for age attention/concentration, Appropriate safety awareness, Follows commands  Perception: Appears intact  Perseveration: No perseveration noted  Safety/Judgement: Awareness of environment, Good awareness of safety precautions  Oral Assessment:  Oral Assessment  Labial: No impairment  Dentition: Natural;Intact  Oral Hygiene: Fair  Lingual: No impairment  Velum: No impairment  Mandible: No impairment  P.O. Trials:  Patient Position: HOB 60  Vocal quality prior to P.O.: No impairment  Consistency Presented: Thin liquid;Puree; Solid  How Presented: Self-fed/presented;Cup/sip;Straw;Successive swallows     Bolus Acceptance: No impairment  Bolus Formation/Control: No impairment     Propulsion: No impairment  Oral Residue: None  Initiation of Swallow: No impairment  Laryngeal Elevation: Functional  Aspiration Signs/Symptoms: None  Pharyngeal Phase Characteristics: No impairment, issues, or problems   Effective Modifications: Small sips and bites  Cues for Modifications: Minimal       Oral Phase Severity: No impairment  Pharyngeal Phase Severity : No impairment    PAIN:  Pain level pre-treatment: 5/10   Pain level post-treatment: 5/10     After evaluation:   ?            Patient left in no apparent distress sitting up in chair  ? Patient left in no apparent distress in bed  ? Call bell left within reach  ? Nursing notified  ? Family present  ? Caregiver present  ?             Bed alarm activated      COMMUNICATION/EDUCATION:   ?            Aspiration precautions; swallow safety; compensatory techniques. ?            Patient/family have participated as able in goal setting and plan of care. ?            Patient/family agree to work toward stated goals and plan of care. ?            Patient understands intent and goals of therapy; neutral about participation. ? Patient unable to participate in goal setting/plan of care; educ ongoing with interdisciplinary staff  ? Posted safety precautions in patient's room.     Thank you for this referral.  CLARE Guillen  Time Calculation: 14 mins

## 2019-08-22 NOTE — PROGRESS NOTES
Stroke Education provided to patient and the following topics were discussed    1. Patients personal risk factors for stroke are prior stroke    2. Warning signs of Stroke:        * Sudden numbness or weakness of the face, arm or leg, especially on one side of          The body            * Sudden confusion, trouble speaking or understanding        * Sudden trouble seeing in one or both eyes        * Sudden trouble walking, dizziness, loss of balance or coordination        * Sudden severe headache with no known cause      3. Importance of activation Emergency Medical Services ( 9-1-1 ) immediately if experience any warning signs of stroke. 4. Be sure and schedule a follow-up appointment with your primary care doctor or any specialists as instructed. 5. You must take medicine every day to treat your risk factors for stroke. Be sure to take your medicines exactly as your doctor tells you: no more, no less. Know what your medicines are for , what they do. Anti-thrombotics /anticoagulants can help prevent strokes. You are taking the following medicine(s)  Heparin     6. Smoking and second-hand smoke greatly increase your risk of stroke, cardiovascular disease and death. Smoking history cigarettes, a few per day    7. Information provided was [facility] Stroke Education Binder    8. Documentation of teaching completed in Patient Education Activity and on Care Plan with teaching response noted?   yes

## 2019-08-22 NOTE — PROGRESS NOTES
Speech Therapy Note:    SLP orders received and attempted however, patient:      []  Lethargic, unable to be alerted enough for safe PO intake  []  Refused participation  []  Off the unit  []  NPO for procedure  [x]  Other: having echocardiogram at bedside     SLP will f/u later this day or as medically indicated.  Thank you for this referral.     Isabelle Klein M.S., [id number] [facility]  Speech Language Pathologist

## 2019-08-22 NOTE — PROGRESS NOTES
Problem: Mobility Impaired (Adult and Pediatric)  Goal: *Acute Goals and Plan of Care (Insert Text)  Description  Physical Therapy Goals  Initiated 8/21/2019 and to be accomplished within 7 day(s)  1. Patient will move from supine to sit and sit to supine  in bed with supervision/set-up. 2.  Patient will transfer from bed to chair and chair to bed with supervision/set-up using the least restrictive device. 3.  Patient will perform sit to stand with modified independence. 4.  Patient will ambulate with modified independence for 50 feet with the least restrictive device. 5.  Patient will ascend/descend 3 stairs with B handrail(s) with modified independence. Outcome: Progressing Towards Goal     Problem: Patient Education: Go to Patient Education Activity  Goal: Patient/Family Education  Outcome: Progressing Towards Goal    PHYSICAL THERAPY: INITIAL ASSESSMENT   OBSERVATION: Medicare: Hospital Day: 1     Patient: Joyce Colbert [de-identified]77 y.o. male)    Date: 8/21/2019  Primary Diagnosis: Stroke ([facility] [address] [I63.9]  TIA (transient ischemic attack) [G45.9]   PLOF: Pt was mod (I) with cane at home with no falls. ASSESSMENT :  Patient requires between minimal assistance/contact guard assist for bed mobility, transfers and ambulation. Patient presents with deficits in:  Bed Mobility, Transfers, Gait, Strength and Range of Motion    Patient will benefit from skilled intervention to address the above impairments. Patients rehabilitation potential is considered to be Good  Factors which may influence rehabilitation potential include:   ? None noted  ? Mental ability/status  ? Medical condition  ? Home/family situation and support systems  ? Safety awareness  ? Pain tolerance/management  ? Other:      PLAN :   Recommendations and Planned Interventions:  ?           Bed Mobility Training             ? Neuromuscular Re-Education  ?            Transfer Training ?    Orthotic/Prosthetic Training  ? Gait Training                          ? Modalities  ? Therapeutic Exercises          ? Edema Management/Control  ? Therapeutic Activities            ? Patient and Family Training/Education  ? Other (comment):      EDUCATION:   Education:  Patient was educated on the following topics: fall prevention, gait, mobility strategies    Barriers to Learning/Limitations: None  Compensate with: visual, verbal, tactile, kinesthetic cues/model    Recommendations for the next treatment session: gait, balance, and rolling right  Frequency/Duration: Patient will be followed by physical therapy 1-2 times per day/4-7 days per week to address goals. Discharge Recommendations: Outpatient  Further Equipment Recommendations for Discharge: rolling walker  Factors which may impact discharge planning: progress with PT     SUBJECTIVE:   Patient stated this is my daughter, Im good.     OBJECTIVE DATA SUMMARY:     Past Medical History:   Diagnosis Date    Peptic ulcer disease      Past Surgical History:   Procedure Laterality Date    HX ORTHOPAEDIC      knee surgery         Eval Complexity: History: MEDIUM  Complexity : 1-2 comorbidities / personal factors will impact the outcome/ POC Exam:HIGH Complexity : 4+ Standardized tests and measures addressing body structure, function, activity limitation and / or participation in recreation  Presentation: MEDIUM Complexity : Evolving with changing characteristics  Clinical Decision Making:Medium Complexity    Overall Complexity:MEDIUM    Prior Level of Function/Home Situation:   Home Situation  Home Environment: Private residence  # Steps to Enter: 1  One/Two Story Residence: Two story  # of Interior Steps: 8  Living Alone: No  Support Systems: Spouse/Significant Other/Partner  Patient Expects to be Discharged to[de-identified] Private residence  Current DME Used/Available at Home: None  Critical Behavior: Orientation Level: Oriented X4  Cognition: Follows commands     Psychosocial  Purposeful Interaction: Yes  Pt Identified Daily Priority: Clinical issues (comment)  Caritas Process: Establish trust;Attend basic human needs; Supportive expression  Caring Interventions: Reassure; Therapeutic modalities  Reassure: Therapeutic listening; Informing;Caring rounds  Therapeutic Modalities: Intentional therapeutic touch  Skin Condition/Temp: Flaky;Dry(Bilateral feet)     Skin Integrity: Intact  Skin Integumentary  Skin Color: Appropriate for ethnicity  Skin Condition/Temp: Flaky;Dry(Bilateral feet)  Skin Integrity: Intact  Turgor: Non-tenting  Hair Growth: Present  Varicosities: Absent       Manual Muscle Testing (LE)         R     L    Hip Flexion:   4+/5  4+/5  Knee EXT:   4+/5  4+/5  Knee FLEX:   4+/5  4+/5  Ankle DF:   4+/5  4+/5  _________________________________________________       Functional Mobility:      Functional Status     Indep   (I)   Mod I   Super-vision   Min A   Mod A   Max A   Total A   Assist x2 Verbal cues Additional time Not tested   Comments   Rolling ?  ?  ? ?    ?    ?  ?  ? ? ? ? Supine to sit ?  ?  ? ?  ?  ?  ?  ? ? ? ? Sit to supine ? ?  ? ?  ?  ?  ?  ? ? ? ? Sit to stand ?  ?  ? ?  ?  ?  ?  ? ? ? ? Stand to sit ?  ?  ? ?  ?  ?  ?  ? ? ? ? Bed to chair transfers ? ?  ? ?  ?  ?  ?  ? ? ? ? Balance    Good   Fair   Poor   Unable   Not tested   Comments   Sitting static ?  ?  ?  ?  ? Sitting dynamic ?  ?  ?  ?  ? Standing static ?  ?  ?  ?  ? Standing dynamic ?  ?  ?  ?  ?         Mobility/Gait:   Level of Assistance: Minimum assistance  Assistive Device: rolling walker  Distance Ambulated: 50 feet     Speed/Magali: pace decreased (<100 feet/min)  Step Length: left shortened and right shortened  Swing Pattern: left asymmetrical and right asymmetrical  Stance: left decreased and right decreased  Gait Abnormalities: shuffling gait      Pain:  Pre treatment pain level: 0  Post treatment pain level: 0  Pain Scale 1: Numeric (0 - 10)  Pain Intensity 1: 0      Vital Signs  Temp: 98.2 °F (36.8 °C)     Pulse (Heart Rate): 81     BP: 153/77     Resp Rate: 18     O2 Sat (%): 100 %    Activity Tolerance:   Good     Please refer to the flowsheet for vital signs taken during this treatment. After treatment:   ?         Patient left in no apparent distress sitting up in chair  ? Patient left in no apparent distress in bed  ? Call bell left within reach  ? Nursing notified  ? Caregiver present  ? Bed alarm activated    COMMUNICATION/EDUCATION:   ?         Fall prevention education was provided and the patient/caregiver indicated understanding. ? Patient/family have participated as able in goal setting and plan of care. ?         Patient/family agree to work toward stated goals and plan of care. ?         Patient understands intent and goals of therapy, but is neutral about his/her participation. ? Patient is unable to participate in goal setting and plan of care.     Recommendations for nursing:  Verbally communicated to: nurse Maldonado    Thank you for this referral.  Mateo Acevedo, PT

## 2019-08-22 NOTE — PROGRESS NOTES
800 [facility] Assessment completed, patient is alert and oriented x's 4, no c/o pain. Bilateral  are equal and weak with no facial droop. NSR on the monitor with a HR in the 80's. Lung fields are clear throughout on RA, 02 sat sustained at 100% with no cough noted. Patient is tolerating a diabetic diet without any N/V or gastric distention. Patient is voiding and ambulating with cane without any complications. Scheduled medications administered as ordered without any complications. Patient is currently sitting up on side of bed with family at side, no s/s of any distress, VSS, call bell and personal belongings within reach, will continue to monitor. 1201 Stroke treatment brochure was provided to: Nina Khalil patient. Rationale for acute work-up of symptoms explained. Possible treatments, such as tPA or intervention for ischemic strokes and the need for a quick work-up, have been reviewed. 1443 Scheduled heparin administered as ordered without any complications, s/s of any bleeding discussed with patient, understanding verbalized. Patient is currently preparing for nap, no s/s of any pain or distress, will continue to monitor.

## 2019-08-22 NOTE — PROGRESS NOTES
Problem: Falls - Risk of  Goal: *Absence of Falls  Description  Document Yfn Culver Fall Risk and appropriate interventions in the flowsheet.   Outcome: Progressing Towards Goal  Note:   Fall Risk Interventions:  Mobility Interventions: Patient to call before getting OOB         Medication Interventions: Teach patient to arise slowly, Patient to call before getting OOB         History of Falls Interventions: Bed/chair exit alarm, Door open when patient unattended         Problem: Patient Education: Go to Patient Education Activity  Goal: Patient/Family Education  Outcome: Progressing Towards Goal     Problem: Patient Education: Go to Patient Education Activity  Goal: Patient/Family Education  Outcome: Progressing Towards Goal     Problem: TIA/CVA Stroke: 0-24 hours  Goal: Treatments/Interventions/Procedures  Outcome: Progressing Towards Goal

## 2019-08-23 VITALS
BODY MASS INDEX: 23.98 KG/M2 | TEMPERATURE: 99.5 F | OXYGEN SATURATION: 98 % | WEIGHT: 177 LBS | DIASTOLIC BLOOD PRESSURE: 66 MMHG | HEIGHT: 72 IN | HEART RATE: 95 BPM | RESPIRATION RATE: 16 BRPM | SYSTOLIC BLOOD PRESSURE: 139 MMHG

## 2019-08-23 LAB
ANION GAP SERPL CALC-SCNC: 9 MMOL/L (ref 3–18)
BUN SERPL-MCNC: 13 MG/DL (ref 7–18)
BUN/CREAT SERPL: 13 (ref 12–20)
CALCIUM SERPL-MCNC: 9 MG/DL (ref 8.5–10.1)
CHLORIDE SERPL-SCNC: 104 MMOL/L (ref 100–111)
CO2 SERPL-SCNC: 26 MMOL/L (ref 21–32)
CREAT SERPL-MCNC: 1.01 MG/DL (ref 0.6–1.3)
ERYTHROCYTE [DISTWIDTH] IN BLOOD BY AUTOMATED COUNT: 13.8 % (ref 11.6–14.5)
GLUCOSE BLD STRIP.AUTO-MCNC: 109 MG/DL (ref 70–110)
GLUCOSE BLD STRIP.AUTO-MCNC: 117 MG/DL (ref 70–110)
GLUCOSE SERPL-MCNC: 98 MG/DL (ref 74–99)
HCT VFR BLD AUTO: 39 % (ref 36–48)
HGB BLD-MCNC: 12.3 G/DL (ref 13–16)
MAGNESIUM SERPL-MCNC: 2 MG/DL (ref 1.6–2.6)
MCH RBC QN AUTO: 28 PG (ref 24–34)
MCHC RBC AUTO-ENTMCNC: 31.5 G/DL (ref 31–37)
MCV RBC AUTO: 88.6 FL (ref 74–97)
PLATELET # BLD AUTO: 455 K/UL (ref 135–420)
PMV BLD AUTO: 10.5 FL (ref 9.2–11.8)
POTASSIUM SERPL-SCNC: 3.8 MMOL/L (ref 3.5–5.5)
RBC # BLD AUTO: 4.4 M/UL (ref 4.7–5.5)
SODIUM SERPL-SCNC: 139 MMOL/L (ref 136–145)
WBC # BLD AUTO: 10.1 K/UL (ref 4.6–13.2)

## 2019-08-23 PROCEDURE — 97116 GAIT TRAINING THERAPY: CPT

## 2019-08-23 PROCEDURE — 36415 COLL VENOUS BLD VENIPUNCTURE: CPT

## 2019-08-23 PROCEDURE — 82962 GLUCOSE BLOOD TEST: CPT

## 2019-08-23 PROCEDURE — 83735 ASSAY OF MAGNESIUM: CPT

## 2019-08-23 PROCEDURE — 74011250637 HC RX REV CODE- 250/637: Performed by: HOSPITALIST

## 2019-08-23 PROCEDURE — 99218 HC RM OBSERVATION: CPT

## 2019-08-23 PROCEDURE — 80048 BASIC METABOLIC PNL TOTAL CA: CPT

## 2019-08-23 PROCEDURE — 74011250637 HC RX REV CODE- 250/637: Performed by: PSYCHIATRY & NEUROLOGY

## 2019-08-23 PROCEDURE — 85027 COMPLETE CBC AUTOMATED: CPT

## 2019-08-23 PROCEDURE — 65660000000 HC RM CCU STEPDOWN

## 2019-08-23 PROCEDURE — 74011250636 HC RX REV CODE- 250/636: Performed by: HOSPITALIST

## 2019-08-23 RX ORDER — ATORVASTATIN CALCIUM 40 MG/1
40 TABLET, FILM COATED ORAL DAILY
Qty: 30 TAB | Refills: 0 | Status: SHIPPED | OUTPATIENT
Start: 2019-08-24 | End: 2019-10-01

## 2019-08-23 RX ORDER — ASPIRIN 325 MG
325 TABLET, DELAYED RELEASE (ENTERIC COATED) ORAL DAILY
Qty: 30 TAB | Refills: 0 | Status: SHIPPED | OUTPATIENT
Start: 2019-08-24 | End: 2020-06-28

## 2019-08-23 RX ADMIN — ASPIRIN 325 MG: 325 TABLET, DELAYED RELEASE ORAL at 08:42

## 2019-08-23 RX ADMIN — Medication 10 ML: at 15:03

## 2019-08-23 RX ADMIN — AMLODIPINE BESYLATE 10 MG: 5 TABLET ORAL at 08:42

## 2019-08-23 RX ADMIN — FAMOTIDINE 20 MG: 10 INJECTION, SOLUTION INTRAVENOUS at 08:41

## 2019-08-23 RX ADMIN — ATORVASTATIN CALCIUM 40 MG: 20 TABLET, FILM COATED ORAL at 08:42

## 2019-08-23 RX ADMIN — HYDROCHLOROTHIAZIDE 25 MG: 25 TABLET ORAL at 08:42

## 2019-08-23 RX ADMIN — HEPARIN SODIUM 5000 UNITS: 5000 INJECTION INTRAVENOUS; SUBCUTANEOUS at 06:49

## 2019-08-23 NOTE — PROGRESS NOTES
Bedside and Verbal shift change report given to AMBROSE Alvarez  (oncoming nurse) by Beverley Stephenson (offgoing nurse). Report included the following information SBAR, Kardex, Intake/Output, MAR, Recent Results and Cardiac Rhythm NSR.

## 2019-08-23 NOTE — PROGRESS NOTES
Case  Discussed with dr. Behzad Patton, he recommended corry as out pt with cardiac monitor for one month

## 2019-08-23 NOTE — CONSULTS
Cardiolology  Inpatient Consult      Patient: Gibson Raza               Sex: male          DOA: 8/21/2019       YOB: 1950      Age:  77 y.o.        LOS:  LOS: 1 day      Gibson Raza is a 77 y.o. male admitted for Stroke [facility]) [I63.9]  TIA (transient ischemic attack) [G45.9]     Recommendations:  · Echo done and it shows possible probe patent foramen ovale  · May need JONATHAN for confirmation  · Follow    Impression:  · TIA  · Possible probe patent foramen ovale  · Other problems as enumerated below    Patient Active Problem List    Diagnosis Date Noted    Cerebral atherosclerosis 08/22/2019    TIA (transient ischemic attack) 08/21/2019    Smoker 08/21/2019    HTN (hypertension) 22/67/2194    DM w/o complication type II ([address] 08/21/2019    OA (osteoarthritis) 08/21/2019    Hypokalemia 08/21/2019      Other, MD Domenico  Past Medical History:   Diagnosis Date    Peptic ulcer disease       Past Surgical History:   Procedure Laterality Date    HX ORTHOPAEDIC      knee surgery     Allergies   Allergen Reactions    Sulfa (Sulfonamide Antibiotics) Unknown (comments)      History reviewed. No pertinent family history.    Current Facility-Administered Medications   Medication Dose Route Frequency    [START ON 8/23/2019] aspirin delayed-release tablet 325 mg  325 mg Oral DAILY    atorvastatin (LIPITOR) tablet 40 mg  40 mg Oral DAILY    sodium chloride (NS) flush 5-40 mL  5-40 mL IntraVENous Q8H    sodium chloride (NS) flush 5-40 mL  5-40 mL IntraVENous PRN    acetaminophen (TYLENOL) tablet 650 mg  650 mg Oral Q4H PRN    labetalol (NORMODYNE;TRANDATE) 20 mg/4 mL (5 mg/mL) injection 5 mg  5 mg IntraVENous Q10MIN PRN    heparin (porcine) injection 5,000 Units  5,000 Units SubCUTAneous Q8H    famotidine (PF) (PEPCID) 20 mg in sodium chloride 0.9% 10 mL injection  20 mg IntraVENous Q12H    insulin lispro (HUMALOG) injection   SubCUTAneous AC&HS    glucose chewable tablet 16 g  4 Tab Oral PRN    glucagon (GLUCAGEN) injection 1 mg  1 mg IntraMUSCular PRN    insulin glargine (LANTUS) injection 10 Units  10 Units SubCUTAneous QHS    nicotine (NICODERM CQ) 14 mg/24 hr patch 1 Patch  1 Patch TransDERmal DAILY    amLODIPine (NORVASC) tablet 10 mg  10 mg Oral DAILY    hydroCHLOROthiazide (HYDRODIURIL) tablet 25 mg  25 mg Oral DAILY         Review of Symptoms:    Review of Systems  Gen: No fever, chills, malaise, weight loss/gain. Heent: No headache, rhinorrhea, epistaxis, ear pain, hearing loss, sinus pain, neck pain/stiffness, sore throat. Heart: No chest pain, palpitations, LEMA, pnd, or orthopnea. Resp: No cough, hemoptysis, wheezing and shortness of breath. GI: No nausea, vomiting, diarrhea, constipation, melena or hematochezia. : No urinary obstruction, dysuria or hematuria. Derm: No rash, new skin lesion or pruritis. Musc/skeletal: no bone or joint complains. Vasc: No edema, cyanosis or claudication. Endo: No heat/cold intolerance, no polyuria,polydipsia or polyphagia. Neuro: No unilateral weakness, numbness, tingling. No seizures. Dizziness, facial drooping   Heme: No easy bruising or bleeding.       Subjective:    Ld Galeana is a 77 y.o. male who hx of DM, HTN, oa ,smoker came to er due to dizziness today. He was smoking outside of his house. He got up into his room and felt dizziness and left facial droop. \" code s \" was called and tele-neuro recommend admit, no tpa. He denies any speech change, his gait same, no chest pain /sob related. Ct head no acute issue   He is a smoker, not using drugs      . Cardiac risk factors: extant. Physical Exam    Visit Vitals  /77 (BP 1 Location: Left arm, BP Patient Position: Sitting)   Pulse 88   Temp 99.1 °F (37.3 °C)   Resp 18   Ht 6' (1.829 m)   Wt 80.7 kg (178 lb)   SpO2 100%   BMI 24.14 kg/m²       General Appearance:  Well developed, well nourished,alert and oriented x 3, and individual in no acute distress. Ears/Nose/Mouth/Throat:   Hearing grossly normal.         Neck: Supple. Chest:   Lungs clear to auscultation bilaterally. Cardiovascular:  Regular rate and rhythm, S1, S2 normal, no murmur. Abdomen:   Soft, non-tender, bowel sounds are active. Extremities: No edema bilaterally. Skin: Warm and dry. Cardiographics    Telemetry: normal sinus rhythm  ECG: no acute change  Echocardiogram: See report    Recent radiology, intake/output and wt reviewed    Labs:   Recent Results (from the past 48 hour(s))   EKG, 12 LEAD, INITIAL    Collection Time: 08/21/19 11:44 AM   Result Value Ref Range    Ventricular Rate 79 BPM    Atrial Rate 79 BPM    P-R Interval 156 ms    QRS Duration 88 ms    Q-T Interval 400 ms    QTC Calculation (Bezet) 458 ms    Calculated P Axis 66 degrees    Calculated R Axis 21 degrees    Calculated T Axis 53 degrees    Diagnosis       Poor data quality, interpretation may be adversely affected  Normal sinus rhythm  Normal ECG  When compared with ECG of 05-FEB-2017 16:56,  No significant change was found  Confirmed by Danita Singh MD, -- ([id number]) on 8/21/2019 4:59:51 PM     CBC WITH AUTOMATED DIFF    Collection Time: 08/21/19 11:55 AM   Result Value Ref Range    WBC 11.1 4.6 - 13.2 K/uL    RBC 4.55 (L) 4.70 - 5.50 M/uL    HGB 13.0 13.0 - 16.0 g/dL    HCT 40.5 36.0 - 48.0 %    MCV 89.0 74.0 - 97.0 FL    MCH 28.6 24.0 - 34.0 PG    MCHC 32.1 31.0 - 37.0 g/dL    RDW 13.8 11.6 - 14.5 %    PLATELET 295 (H) 044 - 420 K/uL    MPV 9.9 9.2 - 11.8 FL    NEUTROPHILS 65 40 - 73 %    LYMPHOCYTES 25 21 - 52 %    MONOCYTES 8 3 - 10 %    EOSINOPHILS 1 0 - 5 %    BASOPHILS 1 0 - 2 %    ABS. NEUTROPHILS 7.4 1.8 - 8.0 K/UL    ABS. LYMPHOCYTES 2.8 0.9 - 3.6 K/UL    ABS. MONOCYTES 0.8 0.05 - 1.2 K/UL    ABS. EOSINOPHILS 0.1 0.0 - 0.4 K/UL    ABS.  BASOPHILS 0.1 0.0 - 0.1 K/UL    DF AUTOMATED     METABOLIC PANEL, COMPREHENSIVE    Collection Time: 08/21/19 11:55 AM   Result Value Ref Range    Sodium 139 136 - 145 mmol/L Potassium 3.4 (L) 3.5 - 5.5 mmol/L    Chloride 102 100 - 111 mmol/L    CO2 31 21 - 32 mmol/L    Anion gap 6 3.0 - 18 mmol/L    Glucose 126 (H) 74 - 99 mg/dL    BUN 19 (H) 7.0 - 18 MG/DL    Creatinine 0.97 0.6 - 1.3 MG/DL    BUN/Creatinine ratio 20 12 - 20      GFR est AA >60 >60 ml/min/1.73m2    GFR est non-AA >60 >60 ml/min/1.73m2    Calcium 9.2 8.5 - 10.1 MG/DL    Bilirubin, total 0.4 0.2 - 1.0 MG/DL    ALT (SGPT) 24 16 - 61 U/L    AST (SGOT) 17 10 - 38 U/L    Alk.  phosphatase 77 45 - 117 U/L    Protein, total 6.8 6.4 - 8.2 g/dL    Albumin 3.2 (L) 3.4 - 5.0 g/dL    Globulin 3.6 2.0 - 4.0 g/dL    A-G Ratio 0.9 0.8 - 1.7     TROPONIN I    Collection Time: 08/21/19 11:55 AM   Result Value Ref Range    Troponin-I, QT <0.02 0.0 - 0.045 NG/ML   HEMOGLOBIN A1C WITH EAG    Collection Time: 08/21/19 11:55 AM   Result Value Ref Range    Hemoglobin A1c 5.7 (H) 4.2 - 5.6 %    Est. average glucose 117 mg/dL   GLUCOSE, POC    Collection Time: 08/21/19  4:38 PM   Result Value Ref Range    Glucose (POC) 118 (H) 70 - 110 mg/dL   URINALYSIS W/ RFLX MICROSCOPIC    Collection Time: 08/21/19  5:25 PM   Result Value Ref Range    Color YELLOW      Appearance CLEAR      Specific gravity 1.012 1.005 - 1.030      pH (UA) 5.0 5.0 - 8.0      Protein NEGATIVE  NEG mg/dL    Glucose NEGATIVE  NEG mg/dL    Ketone NEGATIVE  NEG mg/dL    Bilirubin NEGATIVE  NEG      Blood NEGATIVE  NEG      Urobilinogen 0.2 0.2 - 1.0 EU/dL    Nitrites NEGATIVE  NEG      Leukocyte Esterase NEGATIVE  NEG     DRUG SCREEN, URINE    Collection Time: 08/21/19  5:25 PM   Result Value Ref Range    BENZODIAZEPINES NEGATIVE  NEG      BARBITURATES NEGATIVE  NEG      THC (TH-CANNABINOL) NEGATIVE  NEG      OPIATES NEGATIVE  NEG      PCP(PHENCYCLIDINE) NEGATIVE  NEG      COCAINE NEGATIVE  NEG      AMPHETAMINES NEGATIVE  NEG      METHADONE NEGATIVE  NEG      HDSCOM (NOTE)    GLUCOSE, POC    Collection Time: 08/21/19  9:05 PM   Result Value Ref Range    Glucose (POC) 140 (H) 70 - 110 mg/dL   GLUCOSE, POC    Collection Time: 08/22/19  6:06 AM   Result Value Ref Range    Glucose (POC) 95 70 - 110 mg/dL   LIPID PANEL    Collection Time: 08/22/19  6:20 AM   Result Value Ref Range    LIPID PROFILE          Cholesterol, total 199 <200 MG/DL    Triglyceride 162 (H) <150 MG/DL    HDL Cholesterol 53 40 - 60 MG/DL    LDL, calculated 113.6 (H) 0 - 100 MG/DL    VLDL, calculated 32.4 MG/DL    CHOL/HDL Ratio 3.8 0 - 5.0     CBC W/O DIFF    Collection Time: 08/22/19  6:20 AM   Result Value Ref Range    WBC 10.1 4.6 - 13.2 K/uL    RBC 4.43 (L) 4.70 - 5.50 M/uL    HGB 12.4 (L) 13.0 - 16.0 g/dL    HCT 39.2 36.0 - 48.0 %    MCV 88.5 74.0 - 97.0 FL    MCH 28.0 24.0 - 34.0 PG    MCHC 31.6 31.0 - 37.0 g/dL    RDW 13.9 11.6 - 14.5 %    PLATELET 575 (H) 377 - 420 K/uL    MPV 10.3 9.2 - 11.8 FL   HEMOGLOBIN A1C WITH EAG    Collection Time: 08/22/19  6:20 AM   Result Value Ref Range    Hemoglobin A1c 5.9 (H) 4.2 - 5.6 %    Est. average glucose 123 mg/dL   TSH 3RD GENERATION    Collection Time: 08/22/19  6:20 AM   Result Value Ref Range    TSH 1.34 0.36 - 3.74 uIU/mL   MAGNESIUM    Collection Time: 08/22/19  6:20 AM   Result Value Ref Range    Magnesium 1.9 1.6 - 2.6 mg/dL   ECHO ADULT COMPLETE    Collection Time: 08/22/19  9:48 AM   Result Value Ref Range    LA Volume 23.3 18 - 58 mL    Right Atrial Area 4C 10.31 cm2    AO ASC D 3.65 cm    Aortic Valve Systolic Peak Velocity 28.31 cm/s    AoV VTI 15.49 cm    Aortic Valve Area by Continuity of Peak Velocity 3.2 cm2    Aortic Valve Area by Continuity of VTI 3.2 cm2    AoV PG 3.8 mmHg    LVIDd 2.32 (A) 4.2 - 5.9 cm    LVPWd 0.74 0.6 - 1.0 cm    LVIDs 3.66 cm    IVSd 0.94 0.6 - 1.0 cm    LV ED Vol A2C 121.9 mL    LV ES Vol A4C 46.1 mL    LV ES Vol BP 47.7 22 - 58 mL    LVOT d 2.07 cm    LVOT Peak Velocity 93.16 cm/s    LVOT Peak Gradient 3.5 mmHg    LVOT VTI 14.69 cm    LV E' Septal Velocity 5.00 cm/s    LV E' Lateral Velocity 8.00 cm/s    MVA (PHT) 2.9 cm2    MV A Seth 85.98 cm/s    MV E Seth 47.19 cm/s    MV E/A 0.55     RVIDd 3.66 cm    Tricuspid Valve E Wave Peak Velocity 0.00 cm/s    Tricuspid Valve A Wave Peak Velocity 0.00 cm/s    Aortic Valve Systolic Mean Gradient 2.1 mmHg    BP EF 58.6 55 - 100 %    LV Ejection Fraction MOD 4C 54 %    LV Ejection Fraction MOD 2C 62 %    LA Vol 4C 31.19 18 - 58 mL    LA Vol 2C 23.31 18 - 58 mL    LA Area 2C 9.64 cm2    LA Area 4C 10.4 cm2    LV Mass AL 40.2 (A) 88 - 224 g    LV Mass AL Index 19.8 (A) 49 - 115 g/m2    LVPWs 0.00 cm    E/E' lateral 5.90     E/E' septal 9.44     TAPSV 1.7 cm/s    IVC proximal 1.60 cm    E/E' ratio (averaged) 7.67     LV ES Vol A2C 46.8 mL    LVES Vol Index BP 23.5 mL/m2    LV ED Vol A4C 100.0 mL    LVED Vol Index BP 56.9 mL/m2    Mitral Valve E Wave Deceleration Time 262.9 ms    Mitral Valve Pressure Half-time 76.2 ms    Left Atrium Major Axis 3.33 cm    Triscuspid Valve Regurgitation Peak Gradient 19.8 mmHg    LV ED Vol .3 67 - 155 ml    TR Max Velocity 222.70 cm/s    LA Vol Index 11.49 16 - 28 ml/m2    Right Atrial Volume 21.0 ml    LA Vol Index 11.50 16 - 28 ml/m2    LA Vol Index 15.38 16 - 28 ml/m2    LVED Vol Index A4C 49.3 mL/m2    LVED Vol Index A2C 60.1 mL/m2    LVES Vol Index A4C 22.7 mL/m2    LVES Vol Index A2C 23.1 mL/m2    KEYONNA/BSA Pk Seth 1.6 cm2/m2    KEYONNA/BSA VTI 1.6 cm2/m2   GLUCOSE, POC    Collection Time: 08/22/19 11:14 AM   Result Value Ref Range    Glucose (POC) 146 (H) 70 - 110 mg/dL   GLUCOSE, POC    Collection Time: 08/22/19  4:20 PM   Result Value Ref Range    Glucose (POC) 92 70 - 110 mg/dL   GLUCOSE, POC    Collection Time: 08/22/19  9:18 PM   Result Value Ref Range    Glucose (POC) 131 (H) 70 - 110 mg/dL           Paula Good MD

## 2019-08-23 NOTE — ROUTINE PROCESS
Bedside and Verbal shift change report given to Alex Michele RN (oncoming nurse) by RACHEAL Yip RN (offgoing nurse). Report included the following information SBAR, Kardex, Intake/Output, MAR and Recent Results.

## 2019-08-23 NOTE — PROGRESS NOTES
INITIAL NUTRITION ASSESSMENT     RECOMMENDATIONS/PLAN:   Other: Continue w/ POC  Monitor labs/lytes, PO intakes, skin integrity, wt, fluid status, BM      REASON FOR ASSESSMENT:   []  MD Consult:    [] General Nutrition Management and Supplements   [] Management of Tube Feeding   [] Calorie Count    [] Diet Education  []  RN Referral:    [x] MST score >/=2  Malnutrition Screening Tool (MST):  Recently Lost Weight Without Trying: Yes  If Yes, How Much Weight Loss: 2-13 lbs  Eating Poorly Due to Decreased Appetite: Yes  MST Score: 2    [] Enteral/Parenteral Nutrition PTA   [] Pregnant (Not in Labor):  [] Gestational DM     [] Multigestation   [] Pressure Ulcer/Wound Care needs  [] Positive Nutrition Screen:  [] BMI <19  [] NPO/Clear Liquid Diet > 5 days (>3 days in ICU)  [] New Order for TPN  [] LOS  [] ICU admission  NUTRITION ASSESSMENT:   Client History: 77 yrs old Male admitted with TIA-no stroke per MD, cta :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 with  possible segmental occlusion on the right, echo possible shunt needed, DM, HTN, smoker, OA, hypokalemia      PMHx: peptic ulcer disease    Cultural/Spiritism Food Preferences: None Identified    FOOD/NUTRITION HISTORY  Diet History: pt reported that he has been trying to make healthier choices to help lose wt; he has a good appetite   Food Allergies:  [x] NKFA       Pertinent PTA Medications: metformin, reglan,      NUTRITION INTAKE   Diet Order:  DM AHA      Average PO Intake:       Patient Vitals for the past 100 hrs:   % Diet Eaten   08/22/19 0932 100 %   08/22/19 0552 0 %   08/21/19 1729 100 %      Pertinent Medications:  [x] Reviewed; pepcid,   Electrolyte Replacement Protocol: []K  []Mg  []PO4    Insulin:  [] SSI  [x] Pre-meal   [x]  Basal   [] Drip  [] None  Pt expected to meet estimated nutrient needs through next review:          [x]  Yes     [] No;  ANTHROPOMETRICS  Height: 6' (182.9 cm)       Weight: 80.3 kg (177 lb)    BMI: 24 kg/m^2  -  normal weight (18.5%-24.9% BMI)        Weight change: pt has had noted wt loss, but pt has reported that he has been trying to make healthier choices in his diet                                   Comparison to Reference Standards:  IBW: 178 lbs      %IBW: 99%      AdjBW: n/a    NUTRITION-FOCUSED PHYSICAL ASSESSMENT  Skin: No PU. GI: No BM    BIOCHEMICAL DATA & MEDICAL TESTS  Pertinent Labs:  [x] Reviewed;      NUTRITION PRESCRIPTION  Calories: 1930 kcal/day based on [place] x 1.2  Protein: 64-80g/day based on 0.8-1.0 g/kg  CHO: 241 g/day based on 50% of total energy  Fluid: 1930 ml/day based on 1 kcal/ml      NUTRITION DIAGNOSES:   1. No nutritional problems at this time     NUTRITION INTERVENTIONS:   INTERVENTIONS:        GOALS:  1. Other: Continue w/ POC 1. Encourage PO intake >50% at all meals by next review 7 days     Will defer to glycemic control team     NUTRITION MONITORING /EVALUATION:   Follow PO intake  Monitor wt  Monitor renal labs, electrolytes, fluid status    [] Participated in Interdisciplinary Rounds  [x] [address] [place] [address] Reviewed/Documented  DISCHARGE NUTRITION RECOMMENDATIONS ADDRESSED:      [x] To be determined closer to discharge    NUTRITION RISK:     []  At risk                     [x]  Not currently at risk     Will follow-up per policy.   Zeus Villafuerte 1

## 2019-08-23 NOTE — PROGRESS NOTES
Problem: Mobility Impaired (Adult and Pediatric)  Goal: *Acute Goals and Plan of Care (Insert Text)  Description  Physical Therapy Goals  Initiated 8/21/2019 and to be accomplished within 7 day(s)  1. Patient will move from supine to sit and sit to supine  in bed with supervision/set-up. 2.  Patient will transfer from bed to chair and chair to bed with supervision/set-up using the least restrictive device. 3.  Patient will perform sit to stand with modified independence. 4.  Patient will ambulate with modified independence for 50 feet with the least restrictive device. 5.  Patient will ascend/descend 3 stairs with B handrail(s) with modified independence. Outcome: Resolved/Met   PHYSICAL THERAPY TREATMENT AND DISCHARGE    Patient: Aroldo Mckeon (12 y.o. male)  Date: 8/23/2019  Diagnosis: Stroke ([facility] [address] [I63.9]  TIA (transient ischemic attack) [G45.9]  TIA (transient ischemic attack) [G45.9] TIA (transient ischemic attack)    Precautions: Fall, Skin  PLOF: ambulatory with a cane, has a RW and rollator available    ASSESSMENT:  Pt sitting EOB upon entering room. No assistance needed for sit to stand with bed in lowest position. Ambulated 300ft with RW, decreased step height and length, decreased stride, keeps RLE stiff through swing phase of gait, no LOB or path deviations. Pt does not have any stairs at home, deferred stair nego. Recommended pt use his RW or rollator upon returning home for safety until back to baseline. PLAN:  Maximum therapeutic gains met at current level of care and patient will be discharged from physical therapy at this time. Rationale for discharge:  ?     Goals Achieved  ? Plateau Reached  ? Patient not participating in therapy  ? Other:  Discharge Recommendations:  Home Health or [facility]  Further Equipment Recommendations for Discharge:  rolling walker     SUBJECTIVE:   Patient stated I walked last night all by my self.     OBJECTIVE DATA SUMMARY:   Critical Behavior:  Neurologic State: Alert  Orientation Level: Oriented X4  Cognition: Appropriate decision making, Appropriate for age attention/concentration, Appropriate safety awareness, Follows commands  Safety/Judgement: Awareness of environment, Insight into deficits  Functional Mobility Training:  Transfers:  Sit to Stand: Stand-by assistance  Stand to Sit: Stand-by assistance  Balance:  Sitting: Intact  Standing: Intact; With support   Ambulation/Gait Training:  Distance (ft): 300 Feet (ft)  Assistive Device: Gait belt;Walker, rolling  Ambulation - Level of Assistance: Stand-by assistance;Supervision    Gait Abnormalities: Antalgic;Decreased step clearance  Speed/Magali: Slow  Step Length: Right shortened  Swing Pattern: Right asymmetrical  Pain:  Pain level pre-treatment: 0/10   Pain level post-treatment: 0/10   Pain Intervention(s): Medication (see MAR); Rest, Ice, Repositioning   Response to intervention: Nurse notified, See doc flow    Activity Tolerance:   Good  Please refer to the flowsheet for vital signs taken during this treatment. After treatment:   ? Patient left in no apparent distress sitting up in chair  ? Patient left in no apparent distress in bed  ? Call bell left within reach  ? Nursing notified  ? Caregiver present  ? Bed alarm activated  ? SCDs applied      COMMUNICATION/EDUCATION:   ?         Role of Physical Therapy in the acute care setting. ?         Fall prevention education was provided and the patient/caregiver indicated understanding. ? Patient/family have participated as able and agree with findings and recommendations. ?         Patient is unable to participate in plan of care at this time. ?          Other:        Eileen Bland PTA   Time Calculation: 14 mins

## 2019-08-23 NOTE — PROGRESS NOTES
Cardiology Progress Note        Patient: Arline Torres        Sex: male          DOA: 8/21/2019  YOB: 1950      Age:  77 y.o.        LOS:  LOS: 1 day   Assessment/Plan     Principal Problem:    TIA (transient ischemic attack) (8/21/2019)    Active Problems:    Smoker (8/21/2019)      HTN (hypertension) (8/21/2019)      DM w/o complication type II ([address] (8/21/2019)      OA (osteoarthritis) (8/21/2019)      Hypokalemia (8/21/2019)      Cerebral atherosclerosis (8/22/2019)        Plan:  Cardiac tele NSR   will schedule out pt JONATHAN with anesthesia, since pt is not in scheduled today. Will need out pt one month cardiac monitor  Discussed with pt and Dr. Hayley Cox. Thx                    Subjective:    cc:  TIA  PFO      REVIEW OF SYSTEMS:     General: No fevers or chills. Cardiovascular: No chest pain or pressure. No palpitations. No ankle swelling  Pulmonary: No SOB, orthopnea, PND  Gastrointestinal: No nausea, vomiting or diarrhea      Objective:      Visit Vitals  /66   Pulse 95   Temp 99.5 °F (37.5 °C)   Resp 16   Ht 6' (1.829 m)   Wt 80.3 kg (177 lb)   SpO2 98%   BMI 24.01 kg/m²     Body mass index is 24.01 kg/m². Physical Exam:  General Appearance: Comfortable, not using accessory muscles of respiration. NECK: No JVD, no thyroidomeglay. LUNGS: Clear bilaterally. HEART: S1+S2 audible,    ABD: Non-tender, BS Audible    EXT: No edema, and no cysnosis. VASCULAR EXAM: Pulses are intact. PSYCHIATRIC EXAM: Mood is appropriate.     Medication:  Current Facility-Administered Medications   Medication Dose Route Frequency    aspirin delayed-release tablet 325 mg  325 mg Oral DAILY    atorvastatin (LIPITOR) tablet 40 mg  40 mg Oral DAILY    sodium chloride (NS) flush 5-40 mL  5-40 mL IntraVENous Q8H    sodium chloride (NS) flush 5-40 mL  5-40 mL IntraVENous PRN    acetaminophen (TYLENOL) tablet 650 mg  650 mg Oral Q4H PRN    labetalol (NORMODYNE;TRANDATE) 20 mg/4 mL (5 mg/mL) injection 5 mg  5 mg IntraVENous Q10MIN PRN    heparin (porcine) injection 5,000 Units  5,000 Units SubCUTAneous Q8H    famotidine (PF) (PEPCID) 20 mg in sodium chloride 0.9% 10 mL injection  20 mg IntraVENous Q12H    insulin lispro (HUMALOG) injection   SubCUTAneous AC&HS    glucose chewable tablet 16 g  4 Tab Oral PRN    glucagon (GLUCAGEN) injection 1 mg  1 mg IntraMUSCular PRN    insulin glargine (LANTUS) injection 10 Units  10 Units SubCUTAneous QHS    nicotine (NICODERM CQ) 14 mg/24 hr patch 1 Patch  1 Patch TransDERmal DAILY    amLODIPine (NORVASC) tablet 10 mg  10 mg Oral DAILY    hydroCHLOROthiazide (HYDRODIURIL) tablet 25 mg  25 mg Oral DAILY               Lab/Data Reviewed:  Procedures/imaging: see electronic medical records for all procedures/Xrays   and details which were not copied into this note but were reviewed prior to creation of Plan       All lab results for the last 24 hours reviewed.      Recent Labs     08/23/19  0700 08/22/19  0620 08/21/19  1155   WBC 10.1 10.1 11.1   HGB 12.3* 12.4* 13.0   HCT 39.0 39.2 40.5   * 463* 437*     Recent Labs     08/23/19  0700 08/21/19  1155    139   K 3.8 3.4*    102   CO2 26 31   GLU 98 126*   BUN 13 19*   CREA 1.01 0.97   CA 9.0 9.2       Signed By: Gisel Giang MD     August 23, 2019

## 2019-08-23 NOTE — DISCHARGE INSTRUCTIONS
Patient Education        Transient Ischemic Attack: Care Instructions  Your Care Instructions    A transient ischemic attack (TIA) is when blood flow to a part of your brain is blocked for a short time. A TIA is like a stroke but usually lasts only a few minutes. A TIA does not cause lasting brain damage. Any vision problems, slurred speech, or other symptoms usually go away in 10 to 20 minutes. But they may last for up to 24 hours. TIAs are often warning signs of a stroke. Some people who have a TIA may have a stroke in the future. A stroke can cause symptoms like those of a TIA. But a stroke causes lasting damage to your brain. You can take steps to help prevent a stroke. One thing you can do is get early treatment. If you have other new symptoms, or if your symptoms do not get better, go back to the emergency room or call your doctor right away. Getting treatment right away may prevent long-term brain damage caused by a stroke. The doctor has checked you carefully, but problems can develop later. If you notice any problems or new symptoms, get medical treatment right away. Follow-up care is a key part of your treatment and safety. Be sure to make and go to all appointments, and call your doctor if you are having problems. It's also a good idea to know your test results and keep a list of the medicines you take. How can you care for yourself at home? Medicines    · Be safe with medicines. Take your medicines exactly as prescribed. Call your doctor if you think you are having a problem with your medicine.     · If you take a blood thinner, such as aspirin, be sure you get instructions about how to take your medicine safely.  Blood thinners can cause serious bleeding problems.     · Call your doctor if you are not able to take your medicines for any reason.     · Do not take any over-the-counter medicines or herbal products without talking to your doctor first.     · If you take birth control pills or hormone therapy, talk to your doctor. Ask if these treatments are right for you.    Lifestyle changes    · Do not smoke. If you need help quitting, talk to your doctor about stop-smoking programs and medicines.     · Be active. If your doctor recommends it, get more exercise. Walking is a good choice. Bit by bit, increase the amount you walk every day. Try for at least 30 minutes on most days of the week. You also may want to swim, bike, or do other activities.     · Eat heart-healthy foods. These include fruits, vegetables, high-fiber foods, fish, and foods that are low in sodium, saturated fat, and trans fat.     · Stay at a healthy weight. Lose weight if you need to.     · Limit alcohol to 2 drinks a day for men and 1 drink a day for women.    Staying healthy    · Manage other health problems such as diabetes, high blood pressure, and high cholesterol.     · Get the flu vaccine every year. When should you call for help? Call 911 anytime you think you may need emergency care. For example, call if:    · You have new or worse symptoms of a stroke. These may include:  ? Sudden numbness, tingling, weakness, or loss of movement in your face, arm, or leg, especially on only one side of your body. ? Sudden vision changes. ? Sudden trouble speaking. ? Sudden confusion or trouble understanding simple statements. ? Sudden problems with walking or balance. ? A sudden, severe headache that is different from past headaches. Call 911 even if these symptoms go away in a few minutes.     · You feel like you are having another TIA.    Watch closely for changes in your health, and be sure to contact your doctor if you have any problems. Where can you learn more? Go to http://ayan-bjorn.info/. Enter [phone] in the search box to learn more about \"Transient Ischemic Attack: Care Instructions. \"  Current as of: September 26, 2018  Content Version: 12.1  © 8712-7197 [employer].  Care instructions adapted under license by [employer] (which disclaims liability or warranty for this information). If you have questions about a medical condition or this instruction, always ask your healthcare professional. [address] any warranty or liability for your use of this information. [employer] Activation    Thank you for requesting access to [employer]. Please follow the instructions below to securely access and download your online medical record. [employer] allows you to send messages to your doctor, view your test results, renew your prescriptions, schedule appointments, and more. How Do I Sign Up? 1. In your internet browser, go to [email or web address]  2. Click on the First Time User? Click Here link in the Sign In box. You will be redirect to the New Member Sign Up page. 3. Enter your [employer] Access Code exactly as it appears below. You will not need to use this code after youve completed the sign-up process. If you do not sign up before the expiration date, you must request a new code. [employer] Access Code: [email or web address]  Expires: 10/5/2019 11:22 AM (This is the date your [employer] access code will )    4. Enter the last four digits of your Social Security Number (xxxx) and Date of Birth (mm/dd/yyyy) as indicated and click Submit. You will be taken to the next sign-up page. 5. Create a [employer] ID. This will be your [employer] login ID and cannot be changed, so think of one that is secure and easy to remember. 6. Create a [employer] password. You can change your password at any time. 7. Enter your Password Reset Question and Answer. This can be used at a later time if you forget your password. 8. Enter your e-mail address. You will receive e-mail notification when new information is available in [address] 9. Click Sign Up. You can now view and download portions of your medical record.   10. Click the Download Summary menu link to download a portable copy of your medical information. Additional Information    If you have questions, please visit the Frequently Asked Questions section of the [employer] website at [email or web address] Remember, [employer] is NOT to be used for urgent needs. For medical emergencies, dial 911. Patient armband removed and shredded    DISCHARGE SUMMARY from Nurse    PATIENT INSTRUCTIONS:    After general anesthesia or intravenous sedation, for 24 hours or while taking prescription Narcotics:  · Limit your activities  · Do not drive and operate hazardous machinery  · Do not make important personal or business decisions  · Do  not drink alcoholic beverages  · If you have not urinated within 8 hours after discharge, please contact your surgeon on call. Report the following to your surgeon:  · Excessive pain, swelling, redness or odor of or around the surgical area  · Temperature over 100.5  · Nausea and vomiting lasting longer than 4 hours or if unable to take medications  · Any signs of decreased circulation or nerve impairment to extremity: change in color, persistent  numbness, tingling, coldness or increase pain  · Any questions        *  Please give a list of your current medications to your Primary Care Provider. *  Please update this list whenever your medications are discontinued, doses are      changed, or new medications (including over-the-counter products) are added. *  Please carry medication information at all times in case of emergency situations. These are general instructions for a healthy lifestyle:    No smoking/ No tobacco products/ Avoid exposure to second hand smoke  Surgeon General's Warning:  Quitting smoking now greatly reduces serious risk to your health.     Obesity, smoking, and sedentary lifestyle greatly increases your risk for illness    A healthy diet, regular physical exercise & weight monitoring are important for maintaining a healthy lifestyle    You may be retaining fluid if you have a history of heart failure or if you experience any of the following symptoms:  Weight gain of 3 pounds or more overnight or 5 pounds in a week, increased swelling in our hands or feet or shortness of breath while lying flat in bed. Please call your doctor as soon as you notice any of these symptoms; do not wait until your next office visit. The discharge information has been reviewed with the patient. The patient verbalized understanding. Discharge medications reviewed with the patient and appropriate educational materials and side effects teaching were provided.   ___________________________________________________________________________________________________________________________________

## 2019-08-23 NOTE — PHYSICIAN ADVISORY
Letter of Admission Status Determination: Upgrade to Inpatient       Gui Peoples was hospitalized as observation status on 8/21/2019. Since Gui Peoples required medically necessary hospital care spanning at least two midnights, he has met the benchmark for Inpatient Admission status. Based on the documented clinical condition and care plan, we recommend upgrading patient's hospitalization status to INPATIENT. The final decision regarding the patient's hospitalization status depends on the attending physician's clinical judgment.        Lakeshia Clemons MD, WANDER, [address] [facility], [address] [place] [address]  [phone]

## 2019-08-23 NOTE — PROGRESS NOTES
Transition of Care Plan:    Met wit patient and wife at bedside along with Dr. Dante Vilchis. Patent lives with his torrey redman son. Patient has medicare for insurance. Patient has Dr. Kenya Adams at [facility] for pcp. Patient reports his symptoms have resolved. Patient denies needs. PT recommended out patient he will follow up with pcp Care Management Interventions  PCP Verified by CM:  Yes  Transition of Care Consult (CM Consult): Discharge Planning  Current Support Network: Lives with Spouse  Confirm Follow Up Transport: Family  Plan discussed with Pt/Family/Caregiver: Yes  Freedom of Choice Offered: Yes  Discharge Location  Discharge Placement: Home with family assistance

## 2019-08-24 NOTE — ROUTINE PROCESS
1601:Received verbal bedside report from off going nurse PATIENCE Lala Patient care received. Patient alert and oriented x 4. Patient resting in bed denies pain. Patient stable. Call light with in reach bed in lowest position.

## 2019-08-24 NOTE — DISCHARGE SUMMARY
Discharge Summary    Patient: Gibson Raza MRN: [id number]  CSN: [id number]    YOB: 1950  Age: 77 y.o. Sex: male    DOA: 8/21/2019 LOS:  LOS: 1 day   Discharge Date: 8/23/2019     Primary Care Provider:  Other, MD Domenico    Admission Diagnoses: Stroke [facility]) [I63.9]  TIA (transient ischemic attack) [G45.9]  TIA (transient ischemic attack) [G45.9]    Discharge Diagnoses:    Hospital Problems  Never Reviewed          Codes Class Noted POA    Cerebral atherosclerosis ICD-10-CM: U26.1  ICD-9-CM: 437.0  8/22/2019 Unknown        * (Principal) TIA (transient ischemic attack) ICD-10-CM: G45.9  ICD-9-CM: 435.9  8/21/2019         Smoker ICD-10-CM: X86.703  ICD-9-CM: 305.1  8/21/2019 Unknown        HTN (hypertension) ICD-10-CM: I10  ICD-9-CM: 401.9  8/21/2019 Unknown        DM w/o complication type II ([facility] [address] ICD-10-CM: E11.9  ICD-9-CM: 250.00  8/21/2019 Unknown        OA (osteoarthritis) ICD-10-CM: M19.90  ICD-9-CM: 715.90  8/21/2019 Unknown        Hypokalemia ICD-10-CM: E87.6  ICD-9-CM: 276.8  8/21/2019 Unknown              Discharge Condition: stable     Discharge Medications:     Discharge Medication List as of 8/23/2019  5:53 PM      START taking these medications    Details   aspirin delayed-release 325 mg tablet Take 1 Tab by mouth daily. , Normal, Disp-30 Tab, R-0      atorvastatin (LIPITOR) 40 mg tablet Take 1 Tab by mouth daily. , Normal, Disp-30 Tab, R-0         CONTINUE these medications which have NOT CHANGED    Details   chlorproMAZINE (THORAZINE) 50 mg tablet Take 1 Tab by mouth two (2) times daily as needed (hiccups). , Print, Disp-20 Tab, R-0      amLODIPine (NORVASC) 10 mg tablet Take 10 mg by mouth daily. , Historical Med      metFORMIN (GLUCOPHAGE) 500 mg tablet Take 500 mg by mouth two (2) times daily (with meals). , Historical Med      hydroCHLOROthiazide (HYDRODIURIL) 25 mg tablet Take 25 mg by mouth daily. , Historical Med      metoclopramide HCl (REGLAN) 10 mg tablet Take 10 mg by mouth Before breakfast, lunch, dinner and at bedtime. , Historical Med      needles, insulin disposable (INSULIN PEN NEEDLE) by Does Not Apply route., Historical Med      raNITIdine (ZANTAC) 150 mg tablet Take 150 mg by mouth two (2) times a day., Historical Med             Procedures : none     Consults: Cardiology and Neurology      PHYSICAL EXAM   Visit Vitals  /66   Pulse 95   Temp 99.5 °F (37.5 °C)   Resp 16   Ht 6' (1.829 m)   Wt 80.3 kg (177 lb)   SpO2 98%   BMI 24.01 kg/m²     General: Awake, cooperative, no acute distress    HEENT: NC, Atraumatic. PERRLA, EOMI. Anicteric sclerae. Lungs:  CTA Bilaterally. No Wheezing/Rhonchi/Rales. Heart:  Regular  rhythm,  No murmur, No Rubs, No Gallops  Abdomen: Soft, Non distended, Non tender. +Bowel sounds,   Extremities: No c/c/e  Psych:   Not anxious or agitated. Neurologic:  No acute neurological deficits. Admission HPI :   Lizandro Pisano is a 77 y.o. male who hx of DM, HTN, oa ,smoker came to er due to dizziness today. He was smoking outside of his house. He got up into his room and felt dizziness and left facial droop. \" code s \" was called and tele-neuro recommend admit, no tpa. He denies any speech change, his gait same, no chest pain /sob related. Ct head no acute issue   He is a smoker, not using drugs     Hospital Course :   Since he was admitted, stroke work up was started, mri brain no stroke indicated, neurologist on board. Aspirin and lipitor was added. Cta 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 with possible segmental occlusion on the right. Echo was done with possible shunt, case discussed with Crispin rodriguez and . Dr. Yasmin Munoz decided to have JONATHAN and one month cardiac monitoring as out -pt. Pt agrees with the plan. Smoking cessation was strongly recommended     Discharge planning discussed with patient and family, agree with the plan and no questions and concerns at this point. Activity: Activity as tolerated    Diet: Cardiac Diet and Diabetic Diet    Follow-up: PCP , dr. Kingston Land -neuro , dr. Margarito Clinton -cardiologist     Disposition: home [place]     Minutes spent on discharge: 45 min       Labs: Results:       Chemistry Recent Labs     08/23/19  0700 08/21/19  1155   GLU 98 126*    139   K 3.8 3.4*    102   CO2 26 31   BUN 13 19*   CREA 1.01 0.97   CA 9.0 9.2   AGAP 9 6   BUCR 13 20   AP  --  77   TP  --  6.8   ALB  --  3.2*   GLOB  --  3.6   AGRAT  --  0.9      CBC w/Diff Recent Labs     08/23/19  0700 08/22/19  0620 08/21/19  1155   WBC 10.1 10.1 11.1   RBC 4.40* 4.43* 4.55*   HGB 12.3* 12.4* 13.0   HCT 39.0 39.2 40.5   * 463* 437*   GRANS  --   --  65   LYMPH  --   --  25   EOS  --   --  1      Cardiac Enzymes No results for input(s): CPK, CKND1, AMY in the last 72 hours. No lab exists for component: CKRMB, TROIP   Coagulation No results for input(s): PTP, INR, APTT in the last 72 hours. No lab exists for component: INREXT    Lipid Panel Lab Results   Component Value Date/Time    Cholesterol, total 199 08/22/2019 06:20 AM    HDL Cholesterol 53 08/22/2019 06:20 AM    LDL, calculated 113.6 (H) 08/22/2019 06:20 AM    VLDL, calculated 32.4 08/22/2019 06:20 AM    Triglyceride 162 (H) 08/22/2019 06:20 AM    CHOL/HDL Ratio 3.8 08/22/2019 06:20 AM      BNP No results for input(s): BNPP in the last 72 hours. Liver Enzymes Recent Labs     08/21/19  1155   TP 6.8   ALB 3.2*   AP 77   SGOT 17      Thyroid Studies Lab Results   Component Value Date/Time    TSH 1.34 08/22/2019 06:20 AM            Significant Diagnostic Studies: Mri Brain Wo Cont    Result Date: 8/21/2019  Brain MR without contrast: Indication: Sudden onset dizziness. Slight left sided facial weakness. Procedure: Sagittal spin echo T1, axial FSE FLAIR and T2 and axial diffusion weighted scanning was performed. An axial T2* scan optimized to detect hemosiderin and calcium was performed.  Coronal spin echo T1and FSE T2 scans were also performed. No contrast was administered. Comparison exam: Head CT 8/21/2019 Findings: Diffusion: There is a small area of subcortical lateral right occipital high signal diffusion felt to be T2 shine through. No convincing areas of restricted diffusion would be consistent with an acute infarction. The T2* scan shows no acute or chronic hemorrhage or abnormal calcifications. Brain parenchyma: Confluent ischemic changes immediate periventricular white matter. 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 Multifocal patchy areas of increased signal in the periventricular, deep and peripheral subcortical white matter of the hemispheres. There is patchy ischemic changes in the jose. No mass or mass effect. Ventricles and sulci: There is moderate cortical atrophy most prominent high at the vertex given age. Additional cerebellar hemispheric atrophy is noted. No definite brainstem atrophy. Extra axial: No extra axial fluid collection or mass. Brain vasculature: Normal, no arterial vascular abnormality is noted. Craniocervical Junction: Normal. Extracranial and skull base:  Bilateral maxillary sinus polyp/retention cysts. Impression: 1. No acute hemorrhage or acute infarction. 2. Atrophy as described with white matter abnormality that is nonspecific but likely ischemic. 3. No other significant intracranial abnormality is appreciated. Ct Head Wo Cont    Result Date: 8/21/2019  EXAM: CT Head INDICATION: Dizziness and facial droop   > Additional: Code stroke. COMPARISON: 02/05/2017. TECHNIQUE: Axial CT imaging of the head was performed without intravenous contrast. One or more dose reduction techniques were used on this CT: automated exposure control, adjustment of the mAs and/or kVp according to patient size, and iterative reconstruction techniques.   The specific techniques used on this CT exam have been documented in the patient's electronic medical record. Digital Imaging and Communications in Medicine (DICOM) format image data are available to nonaffiliated external healthcare facilities or entities on a secure, media free, reciprocally searchable basis with patient authorization for at least a 12-month period after this study. _______________ FINDINGS: BRAIN:   > Brain volume: Diffuse cortical accentuation of the sulcal gyral pattern, sylvian fissures and cerebellar folia   > White matter: Little or no white matter disease. > Infarcts, encephalomalacia: None.   > Parenchymal mass: None.   > Parenchymal hemorrhage: None.   > Midline shift: None.   > Miscellaneous: None. EXTRA-AXIAL SPACES: Unremarkable. No fluid collections. CALVARIUM: Intact. SINUSES, MASTOIDS: Clear. OTHER EXTRACRANIAL: Unremarkable. _______________     IMPRESSION: 1. No CT findings of an acute intracranial abnormality. Please note that noncontrast head CT may be normal in early acute infarct. Stable noncontrast CT scan of the head but no significant change. 2. Diffuse cerebral volume loss. Code S report provided to ordering physician Roney Anand M.D. at [phone] on 8/21/2019, with confirmatory verbal response. Cta Head Neck W Wo Cont    Result Date: 8/22/2019  CTA head and CTA neck with contrast 8/21/2019. CLINICAL INDICATION/HISTORY:   Stroke alert. History of hypertension and diabetes. Presentation with diffuse weakness. COMPARISON:   CT scan of the head and MRI of the brain from 8/21/2019. TECHNIQUE: Multiple axial CT images of the neck were obtained extending from the level of the aortic arch to the skull base during the dynamic infusion 100 cc of [id number]-370 utilizing a CTA protocol. Multiple axial CT images of the head were obtained extending from below the level of the skull base to the vertex after the administration of the IV contrast utilizing a CTA protocol.    Multiplanar reconstructions were obtained, including MIP reconstructions as well as curved planar reformats. Multiple additional 3-D surface rendering reformations were performed at a separate workstation. Dose reduction techniques:  Automated exposure control, mAs and/or kVp reductions based on patient size, and iterative reconstruction. The specific techniques utilized on this CT exam have been documented in the patient's electronic medical record. Digital imaging and communications and medicine (DICOM) format image data are available to nonaffiliated external healthcare facilities or entities on a secure, media free, reciprocally searchable basis with patient authorization for at least a 12 month period after this study. FINDINGS: GREAT VESSEL ORIGINS:   Atheromatous plaque is noted throughout the aortic arch. Mild plaque is present at the origins of the great vessels but there does not appear to be significant stenosis. The origin of the right common carotid artery from the innominate artery is patent. CERVICAL VASCULATURE:   Both common carotid arteries are patent throughout their thoracic and cervical segments. There is heterogeneous plaque involving both carotid bifurcations. However, there is 0% stenosis by NASCET criteria. There is also focal heterogeneous plaque in the distal cervical segment on the left with mild stenosis. Both internal carotid arteries are otherwise patent throughout their cervical segments. Right vertebral artery dominance is noted. There is focal heterogeneous plaque at the origin of the nondominant left vertebral artery with mild stenosis. Minimal narrowing of the right vertebral artery origin is present. There is heterogeneous plaque along the right V1 segment with mild to moderate stenosis. There is also focal calcific plaque along the proximal and mid V2 segments of the left vertebral artery with mild stenoses. Both vertebral arteries are otherwise patent throughout their cervical segments.  INTRACRANIAL VASCULATURE:   Heterogeneous plaque is noted involving both carotid siphons with mild to moderate areas of stenosis. There is also heterogeneous plaque along the V4 segments of both vertebral arteries. There is very severe stenosis or segmental occlusion along the right V4 segment and there is also severe stenosis of the left V4 segment. The distal V4 segments remain patent and the vertebral basilar junction is intact. The basilar artery is diminutive in size with mild to moderate stenoses along its proximal and mid portions. Mild to moderate stenoses are suggested along the P2 segments of both posterior cerebral arteries. There is no evidence of hemodynamically significant stenosis or occlusion involving the remainder of the main intracranial branches. There is no evidence of aneurysm formation or underlying vascular malformation. OTHER:   Centrilobular emphysematous changes are noted in the upper lung zones. IMPRESSION: 1. 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 with possible segmental occlusion on the right. 2.  Mild to moderate stenoses along the proximal and mid portions of the basilar artery. 3.  Mild plaque involving the carotid bulbs but with 0% stenosis by NASCET criteria. 4.  Mild to moderate stenoses involving the carotid siphons. 5.  Mild to moderate stenosis along the V1 segment of the dominant right vertebral artery with mild stenoses on the left. Critical results pertaining to the absence of an acute large vessel occlusion, as per code S protocol, were communicated to the ordering physician by the radiologist at the time of preliminary interpretation (3:30 PM on 8/21/2019).  Stenosis Key: Mild-less than 25% Mild to moderate- 25-50% Moderate- 40-60% Moderate to severe- 50-75% Severe-greater than 75%     Xr Chest Port    Result Date: 8/21/2019  EXAM: One-view chest CLINICAL HISTORY: facial droop , COMPARISON: None FINDINGS: Frontal view of the chest demonstrate clear lungs. Cardiac silhouette is normal in size and contour. No acute bony or soft tissue abnormality. IMPRESSION: No acute pulmonary process identified.               [facility]     CC: Other, Phys, MD

## 2019-08-24 NOTE — ROUTINE PROCESS
Dual AVS reviewed with ALIYA Breen R.N. All medications reviewed individually with patient. Opportunities for questions and concerns provided. Patient discharged via (mode of transport ie. Car, ambulance or air transport) Car  Patient's arm band appropriately discarded.

## 2019-10-01 ENCOUNTER — APPOINTMENT (OUTPATIENT)
Dept: GENERAL RADIOLOGY | Age: 69
End: 2019-10-01
Attending: EMERGENCY MEDICINE
Payer: MEDICARE

## 2019-10-01 ENCOUNTER — APPOINTMENT (OUTPATIENT)
Dept: CT IMAGING | Age: 69
End: 2019-10-01
Attending: EMERGENCY MEDICINE
Payer: MEDICARE

## 2019-10-01 ENCOUNTER — HOSPITAL ENCOUNTER (EMERGENCY)
Age: 69
Discharge: HOME | End: 2019-10-01
Attending: EMERGENCY MEDICINE
Payer: MEDICARE

## 2019-10-01 VITALS
HEIGHT: 72 IN | HEART RATE: 82 BPM | BODY MASS INDEX: 23.7 KG/M2 | OXYGEN SATURATION: 100 % | WEIGHT: 175 LBS | DIASTOLIC BLOOD PRESSURE: 66 MMHG | SYSTOLIC BLOOD PRESSURE: 133 MMHG | RESPIRATION RATE: 29 BRPM | TEMPERATURE: 98.5 F

## 2019-10-01 DIAGNOSIS — M06.9 RHEUMATOID ARTHRITIS FLARE (HCC): Primary | ICD-10-CM

## 2019-10-01 DIAGNOSIS — R55 NEAR SYNCOPE: ICD-10-CM

## 2019-10-01 LAB
ANION GAP SERPL CALC-SCNC: 8 MMOL/L (ref 3–18)
APPEARANCE UR: CLEAR
ATRIAL RATE: 91 BPM
BACTERIA URNS QL MICRO: ABNORMAL /HPF
BASOPHILS # BLD: 0 K/UL (ref 0–0.1)
BASOPHILS NFR BLD: 0 % (ref 0–3)
BILIRUB UR QL: NEGATIVE
BUN SERPL-MCNC: 21 MG/DL (ref 7–18)
BUN/CREAT SERPL: 17 (ref 12–20)
CALCIUM SERPL-MCNC: 9.1 MG/DL (ref 8.5–10.1)
CALCULATED P AXIS, ECG09: 66 DEGREES
CALCULATED R AXIS, ECG10: 37 DEGREES
CALCULATED T AXIS, ECG11: 56 DEGREES
CHLORIDE SERPL-SCNC: 103 MMOL/L (ref 100–111)
CK SERPL-CCNC: 57 U/L (ref 39–308)
CO2 SERPL-SCNC: 26 MMOL/L (ref 21–32)
COLOR UR: YELLOW
CREAT SERPL-MCNC: 1.23 MG/DL (ref 0.6–1.3)
DIAGNOSIS, 93000: NORMAL
DIFFERENTIAL METHOD BLD: ABNORMAL
EOSINOPHIL # BLD: 0.5 K/UL (ref 0–0.4)
EOSINOPHIL NFR BLD: 5 % (ref 0–5)
EPITH CASTS URNS QL MICRO: ABNORMAL /LPF (ref 0–5)
ERYTHROCYTE [DISTWIDTH] IN BLOOD BY AUTOMATED COUNT: 13.8 % (ref 11.6–14.5)
GLUCOSE BLD STRIP.AUTO-MCNC: 98 MG/DL (ref 70–110)
GLUCOSE SERPL-MCNC: 116 MG/DL (ref 74–99)
GLUCOSE UR STRIP.AUTO-MCNC: NEGATIVE MG/DL
HCT VFR BLD AUTO: 36.5 % (ref 36–48)
HGB BLD-MCNC: 11.6 G/DL (ref 13–16)
HGB UR QL STRIP: NEGATIVE
KETONES UR QL STRIP.AUTO: NEGATIVE MG/DL
LEUKOCYTE ESTERASE UR QL STRIP.AUTO: ABNORMAL
LYMPHOCYTES # BLD: 3.9 K/UL (ref 0.8–3.5)
LYMPHOCYTES NFR BLD: 36 % (ref 20–51)
MAGNESIUM SERPL-MCNC: 1.9 MG/DL (ref 1.6–2.6)
MCH RBC QN AUTO: 27.4 PG (ref 24–34)
MCHC RBC AUTO-ENTMCNC: 31.8 G/DL (ref 31–37)
MCV RBC AUTO: 86.1 FL (ref 74–97)
MONOCYTES # BLD: 0.3 K/UL (ref 0–1)
MONOCYTES NFR BLD: 3 % (ref 2–9)
NEUTS BAND NFR BLD MANUAL: 1 % (ref 0–5)
NEUTS SEG # BLD: 6 K/UL (ref 1.8–8)
NEUTS SEG NFR BLD: 55 % (ref 42–75)
NITRITE UR QL STRIP.AUTO: NEGATIVE
P-R INTERVAL, ECG05: 142 MS
PH UR STRIP: 6 [PH] (ref 5–8)
PLATELET # BLD AUTO: 581 K/UL (ref 135–420)
PMV BLD AUTO: 10.1 FL (ref 9.2–11.8)
POTASSIUM SERPL-SCNC: 4 MMOL/L (ref 3.5–5.5)
PROT UR STRIP-MCNC: NEGATIVE MG/DL
Q-T INTERVAL, ECG07: 378 MS
QRS DURATION, ECG06: 90 MS
QTC CALCULATION (BEZET), ECG08: 464 MS
RBC # BLD AUTO: 4.24 M/UL (ref 4.7–5.5)
RBC #/AREA URNS HPF: ABNORMAL /HPF (ref 0–5)
RBC MORPH BLD: ABNORMAL
SODIUM SERPL-SCNC: 137 MMOL/L (ref 136–145)
SP GR UR REFRACTOMETRY: 1.02 (ref 1–1.03)
TROPONIN I SERPL-MCNC: <0.02 NG/ML (ref 0–0.04)
UROBILINOGEN UR QL STRIP.AUTO: 2 EU/DL (ref 0.2–1)
VENTRICULAR RATE, ECG03: 91 BPM
WBC # BLD AUTO: 10.9 K/UL (ref 4.6–13.2)
WBC URNS QL MICRO: ABNORMAL /HPF (ref 0–5)

## 2019-10-01 PROCEDURE — 96360 HYDRATION IV INFUSION INIT: CPT

## 2019-10-01 PROCEDURE — 93005 ELECTROCARDIOGRAM TRACING: CPT

## 2019-10-01 PROCEDURE — A9270 NON-COVERED ITEM OR SERVICE: HCPCS | Performed by: EMERGENCY MEDICINE

## 2019-10-01 PROCEDURE — 96361 HYDRATE IV INFUSION ADD-ON: CPT

## 2019-10-01 PROCEDURE — 82962 GLUCOSE BLOOD TEST: CPT

## 2019-10-01 PROCEDURE — 85025 COMPLETE CBC W/AUTO DIFF WBC: CPT

## 2019-10-01 PROCEDURE — 83735 ASSAY OF MAGNESIUM: CPT

## 2019-10-01 PROCEDURE — 82550 ASSAY OF CK (CPK): CPT

## 2019-10-01 PROCEDURE — 70450 CT HEAD/BRAIN W/O DYE: CPT

## 2019-10-01 PROCEDURE — 74011250637 HC RX REV CODE- 250/637: Performed by: EMERGENCY MEDICINE

## 2019-10-01 PROCEDURE — 74011636637 HC RX REV CODE- 636/637: Performed by: EMERGENCY MEDICINE

## 2019-10-01 PROCEDURE — 71045 X-RAY EXAM CHEST 1 VIEW: CPT

## 2019-10-01 PROCEDURE — 84484 ASSAY OF TROPONIN QUANT: CPT

## 2019-10-01 PROCEDURE — 99285 EMERGENCY DEPT VISIT HI MDM: CPT

## 2019-10-01 PROCEDURE — 81001 URINALYSIS AUTO W/SCOPE: CPT

## 2019-10-01 PROCEDURE — 74011250636 HC RX REV CODE- 250/636: Performed by: EMERGENCY MEDICINE

## 2019-10-01 PROCEDURE — 80048 BASIC METABOLIC PNL TOTAL CA: CPT

## 2019-10-01 RX ORDER — LISINOPRIL 20 MG/1
20 TABLET ORAL DAILY
COMMUNITY
End: 2020-06-28

## 2019-10-01 RX ORDER — CHLORTHALIDONE 25 MG/1
25 TABLET ORAL DAILY
COMMUNITY
End: 2020-06-28

## 2019-10-01 RX ORDER — IBUPROFEN 400 MG/1
400 TABLET ORAL ONCE
Status: COMPLETED | OUTPATIENT
Start: 2019-10-01 | End: 2019-10-01

## 2019-10-01 RX ORDER — PREDNISONE 50 MG/1
50 TABLET ORAL DAILY
Qty: 4 TAB | Refills: 0 | Status: SHIPPED | OUTPATIENT
Start: 2019-10-02 | End: 2019-10-06

## 2019-10-01 RX ADMIN — IBUPROFEN 400 MG: 400 TABLET, FILM COATED ORAL at 14:05

## 2019-10-01 RX ADMIN — SODIUM CHLORIDE 1000 ML: 900 INJECTION, SOLUTION INTRAVENOUS at 12:34

## 2019-10-01 RX ADMIN — PREDNISONE 50 MG: 10 TABLET ORAL at 14:41

## 2019-10-01 NOTE — DISCHARGE INSTRUCTIONS
Patient Education        Rheumatoid Arthritis: Care Instructions  Your Care Instructions    Arthritis is a common health problem in which the joints are inflamed. There are many types of arthritis. In rheumatoid arthritis, the body's own immune system attacks the joints. This causes pain, stiffness, and swelling in the joints, especially in the hands and feet. It can become hard to open jars, write, and do other daily tasks. Sometimes rheumatoid arthritis can also cause bumps to form under the skin. Over time, rheumatoid arthritis can damage and deform joints. Early treatment with medicines may reduce your chances of having a lasting disability. Follow-up care is a key part of your treatment and safety. Be sure to make and go to all appointments, and call your doctor if you are having problems. It's also a good idea to know your test results and keep a list of the medicines you take. How can you care for yourself at home? · If your doctor recommends it, get more exercise. Walking is a good choice. If your knees or ankles hurt, try riding a stationary bike or swimming. · Move each joint gently through its full range of motion once or twice a day. · Rest joints when they are sore or overworked. Short rest breaks may help more than staying in bed. · Reach and stay at a healthy weight. Regular exercise and a healthy diet will help you do this. Extra weight can strain the joints, especially the knees and hips, and make the pain worse. Losing even a few pounds may help. · Get enough calcium and vitamin D to help prevent osteoporosis, which causes thin bones. Talk to your doctor about how much you should take. · Protect your joints from injury. Do not overuse them. Try to limit or avoid activities that cause joint pain or swelling. Use special kitchen tools and other self-help devices as well as walkers, splints, or canes if needed. · Use heat to ease pain. Take warm showers or baths.  Use hot packs or a heating pad set on low. Sleep under a warm electric blanket. · Put ice or a cold pack on the area for 10 to 20 minutes at a time. Put a thin cloth between the ice and your skin. · Take pain medicines exactly as directed. ? If the doctor gave you a prescription medicine for pain, take it as prescribed. ? If you are not taking a prescription pain medicine, ask your doctor if you can take an over-the-counter medicine. · Take an active role in managing your condition. Set up a treatment plan with your doctor, and learn as much as you can about rheumatoid arthritis. This will help you control pain and stay active. When should you call for help? Call your doctor now or seek immediate medical care if:    · You have a fever or a rash along with joint pain.     · You have joint pain that is so severe that you cannot use the joint at all.     · You have sudden swelling, redness, or pain in one or more joints, and you do not know why.     · You have back or neck pain along with weakness in your arms or legs.     · You have a loss of bowel or bladder control.    Watch closely for changes in your health, and be sure to contact your doctor if:    · You have joint pain that lasts for more than 6 weeks.     · You have side effects from your arthritis medicines, such as stomach pain, nausea, heartburn, or dark and tarlike stools. Where can you learn more? Go to http://ayan-bjorn.info/. Enter K205 in the search box to learn more about \"Rheumatoid Arthritis: Care Instructions. \"  Current as of: April 1, 2019  Content Version: 12.2  © 6067-6866 [employer]. Care instructions adapted under license by [employer] (which disclaims liability or warranty for this information). If you have questions about a medical condition or this instruction, always ask your healthcare professional. [place] [address] any warranty or liability for your use of this information.          Patient Education        Lightheadedness or Faintness: Care Instructions  Your Care Instructions  Lightheadedness is a feeling that you are about to faint or \"pass out. \" You do not feel as if you or your surroundings are moving. It is different from vertigo, which is the feeling that you or things around you are spinning or tilting. Lightheadedness usually goes away or gets better when you lie down. If lightheadedness gets worse, it can lead to a fainting spell. It is common to feel lightheaded from time to time. Lightheadedness usually is not caused by a serious problem. It often is caused by a short-lasting drop in blood pressure and blood flow to your head that occurs when you get up too quickly from a seated or lying position. Follow-up care is a key part of your treatment and safety. Be sure to make and go to all appointments, and call your doctor if you are having problems. It's also a good idea to know your test results and keep a list of the medicines you take. How can you care for yourself at home? · Lie down for 1 or 2 minutes when you feel lightheaded. After lying down, sit up slowly and remain sitting for 1 to 2 minutes before slowly standing up. · Avoid movements, positions, or activities that have made you lightheaded in the past.  · Get plenty of rest, especially if you have a cold or flu, which can cause lightheadedness. · Make sure you drink plenty of fluids, especially if you have a fever or have been sweating. · Do not drive or put yourself and others in danger while you feel lightheaded. When should you call for help? Call 911 anytime you think you may need emergency care. For example, call if:    · You have symptoms of a stroke. These may include:  ? Sudden numbness, tingling, weakness, or loss of movement in your face, arm, or leg, especially on only one side of your body. ? Sudden vision changes. ? Sudden trouble speaking.   ? Sudden confusion or trouble understanding simple statements. ? Sudden problems with walking or balance. ? A sudden, severe headache that is different from past headaches.     · You have symptoms of a heart attack. These may include:  ? Chest pain or pressure, or a strange feeling in the chest.  ? Sweating. ? Shortness of breath. ? Nausea or vomiting. ? Pain, pressure, or a strange feeling in the back, neck, jaw, or upper belly or in one or both shoulders or arms. ? Lightheadedness or sudden weakness. ? A fast or irregular heartbeat. After you call 911, the  may tell you to chew 1 adult-strength or 2 to 4 low-dose aspirin. Wait for an ambulance. Do not try to drive yourself.    Watch closely for changes in your health, and be sure to contact your doctor if:    · Your lightheadedness gets worse or does not get better with home care. Where can you learn more? Go to http://ayanBNY Mellonbjorn.info/. Enter D159 in the search box to learn more about \"Lightheadedness or Faintness: Care Instructions. \"  Current as of: June 26, 2019  Content Version: 12.2  © 3250-5467 [employer]. Care instructions adapted under license by [employer] (which disclaims liability or warranty for this information). If you have questions about a medical condition or this instruction, always ask your healthcare professional. [address] any warranty or liability for your use of this information. Patient Education        Rheumatoid Arthritis Diet: Care Instructions  Your Care Instructions    The best diet for people with rheumatoid arthritis is a healthy, balanced diet that is low in saturated fat and salt and high in fiber and complex carbohydrate (whole grains, beans, fruits, and vegetables). Fish oil (omega-3 fatty acids) has a modest effect in reducing inflammation, and eating fish may improve symptoms. People who have rheumatoid arthritis have a high risk of developing osteoporosis.  To help prevent this disease, get plenty of calcium and vitamin D. Follow-up care is a key part of your treatment and safety. Be sure to make and go to all appointments, and call your doctor if you are having problems. It's also a good idea to know your test results and keep a list of the medicines you take. How can you care for yourself at home? · Try to eat at least 2 servings of fish each week. Oily fish, which contain omega-3 fatty acids, include:  ? [place]. ? [place]. ? Mackerel. ? Lake trout. ? Herring. ? Sardines. · If you're pregnant, talk to your doctor about eating fish. Pregnant women shouldn't eat certain types of fish that have high mercury content. · You can get calcium and vitamin D by drinking milk fortified with vitamin D. Four glasses of milk a day provide about 1,200 milligrams (mg) of calcium. Other common foods with calcium:  ? Yogurt (plain or low-fat). An 8-ounce serving provides 415 mg of calcium. ? Cheddar cheese. A 1½-ounce serving provides 306 mg.  ? Milk (skim, 2%, or whole). A 1-cup serving provides about 300 mg.  ? Cottage cheese (1% milk fat). A 1-cup serving provides 138 mg.  · If you can't eat or drink dairy foods, you can get calcium and vitamin D from:  ? Calcium-fortified orange juice. A 1-cup serving provides 500 mg of calcium. ? Calcium-enriched soy milk. A 1-cup serving provides 282 mg of calcium. ? Almonds. A 1-ounce serving (about 24 nuts) provides 75 mg of calcium. ? Canned salmon. A 3-ounce serving provides 180 mg of calcium. ? Tofu (firm, made with calcium sulfate). A ½-cup serving provides 204 mg. · You may need to take a calcium supplement to make sure you are getting the calcium you need. Where can you learn more? Go to http://ayan-bjorn.info/. Enter Q201 in the search box to learn more about \"Rheumatoid Arthritis Diet: Care Instructions. \"  Current as of: November 7, 2018  Content Version: 12.2  © 7608-3493 [employer].  Care instructions adapted under license by [employer] (which disclaims liability or warranty for this information). If you have questions about a medical condition or this instruction, always ask your healthcare professional. [address] any warranty or liability for your use of this information.

## 2019-10-01 NOTE — ED NOTES
EMERGENCY DEPARTMENT HISTORY AND PHYSICAL EXAM    Date: 10/1/2019  Patient Name: Mel Florian    History of Presenting Illness     Chief Complaint   Patient presents with    Dizziness         History Provided By: Patient      Mel Florian is a 77 y.o. male with PMHX of HTN, HLD, Tobacco use, DM2, rheumatoid arthritis, TIA, VBI who presents to the emergency department C/O dizziness for 4 days and joint pain. Patient states 4 days of dizziness - described as feeling lightheaded worse with head movement to either direction. States right sided vision worse and right facial numbness. He also is complaining of pain all over predominantly in shoulders, knees, and ankles. Denies weakness, falls, chest pain, back pain, N/V/D. States compliance to medications. Stopped tobacco use last month. PCP: Domenico Scott MD    Current Outpatient Medications   Medication Sig Dispense Refill    lisinopril (PRINIVIL, ZESTRIL) 20 mg tablet Take 20 mg by mouth daily.  chlorthalidone (HYGROTEN) 25 mg tablet Take 25 mg by mouth daily.  predniSONE (DELTASONE) 50 mg tablet Take 1 Tab by mouth daily for 4 days. Indications: rheumatoid arthritis 4 Tab 0    aspirin delayed-release 325 mg tablet Take 1 Tab by mouth daily. (Patient taking differently: Take 81 mg by mouth daily.) 30 Tab 0    amLODIPine (NORVASC) 10 mg tablet Take 10 mg by mouth daily.  metFORMIN (GLUCOPHAGE) 500 mg tablet Take 1,000 mg by mouth two (2) times daily (with meals).  raNITIdine (ZANTAC) 150 mg tablet Take 150 mg by mouth two (2) times a day. Past History     Past Medical History:  Past Medical History:   Diagnosis Date    [facility] [address]     Hypertension     Peptic ulcer disease     Rheumatoid arthritis ([facility] [address]        Past Surgical History:  Past Surgical History:   Procedure Laterality Date    HX ORTHOPAEDIC      knee surgery       Family History:  History reviewed. No pertinent family history.     Social History:  Social History Tobacco Use    Smoking status: Former Smoker    Smokeless tobacco: Never Used   Substance Use Topics    Alcohol use: No    Drug use: No       Allergies: Allergies   Allergen Reactions    Sulfa (Sulfonamide Antibiotics) Unknown (comments)         Review of Systems   Review of Systems   Constitutional: Negative for activity change, chills and fever. Respiratory: Negative for cough and shortness of breath. Cardiovascular: Negative for chest pain, palpitations and leg swelling. Gastrointestinal: Negative for abdominal distention, nausea and vomiting. Musculoskeletal: Positive for arthralgias and joint swelling. Neurological: Positive for dizziness, light-headedness and numbness. Negative for tremors, syncope, facial asymmetry, speech difficulty, weakness and headaches.        Physical Exam     Vitals:    10/01/19 1146 10/01/19 1234 10/01/19 1430   BP: 120/62 108/61 133/66   Pulse: 89 80 82   Resp: 20  29   Temp: 98.5 °F (36.9 °C)     SpO2: 99%  100%   Weight: 79.4 kg (175 lb)     Height: 6' (1.829 m)       Physical Exam    Nursing notes and vital signs reviewed    Constitutional: Non toxic appearing, no acute distress, sitting in bed comfortably but reluctant to move extremities   Head: Normocephalic, Atraumatic  Eyes: Pupils are equal, round, and reactive to light, EOMI, No nystagmus  Neck: Supple  Cardiovascular: Regular rate and rhythm, no murmurs, rubs, or gallops  Chest: Normal work of breathing and chest excursion bilaterally  Lungs: Clear to ausculation bilaterally  Abdomen: Soft, non tender, non distended, normoactive bowel sounds  Back: No evidence of trauma or deformity  Extremities: No evidence of trauma or deformity, no LE edema  Skin: Warm normal cap refill, dry skin  Neuro: Alert and appropriate, CN intact, normal speech, no nystagmus, head impulse testing negative, finger to nose normal. RUE and LUE 5/5 strength bilaterally, LLE 5/5, RLE ROM limited by pain  Psychiatric: Normal mood and affect      Diagnostic Study Results     Labs -     No results found for this or any previous visit (from the past 12 hour(s)). Radiologic Studies -   CT HEAD WO CONT   Final Result   IMPRESSION:      1. No acute intracranial abnormalities. Unchanged chronic findings as   described. XR CHEST PORT   Final Result   IMPRESSION:         1. No acute radiographic cardiopulmonary abnormality. CT Results  (Last 48 hours)               10/01/19 1307  CT HEAD WO CONT Final result    Impression:  IMPRESSION:       1. No acute intracranial abnormalities. Unchanged chronic findings as   described. Narrative:  EXAM: CT head       INDICATION: Episodic peripheral vertigo. Vertigo x4 days. Joint pain. Headache. COMPARISON: 8/21/2019       TECHNIQUE: Axial CT imaging of the head was performed without intravenous   contrast. One or more dose reduction techniques were used on this CT: automated   exposure control, adjustment of the mAs and/or kVp according to patient size,   and iterative reconstruction techniques. The specific techniques used on this   CT exam have been documented in the patient's electronic medical record. Digital   Imaging and Communications in the Medicine (DICOM) format image data are   available to nonaffiliated external healthcare facilities or entities on a   secure, media free, reciprocally searchable basis with patient authorization for   at least a 12 month period after this study. _______________       FINDINGS:       BRAIN AND POSTERIOR FOSSA:       There is no intracranial hemorrhage, mass effect, or midline shift. There is unchanged generalized cerebral volume loss with associated sulcal and   ventricular prominence. Unchanged mild regions of low attenuation are seen within the periventricular   and subcortical white matter these are nonspecific but are typically chronic   ischemic pattern.        EXTRA-AXIAL SPACES AND MENINGES: There are no abnormal extra-axial fluid   collections. CALVARIUM: Intact. SINUSES: Clear. OTHER: None.       _______________               CXR Results  (Last 48 hours)               10/01/19 1247  XR CHEST PORT Final result    Impression:  IMPRESSION:           1. No acute radiographic cardiopulmonary abnormality. Narrative:  EXAM: XR CHEST PORT       CLINICAL INDICATION/HISTORY: Dizziness   -Additional: None       COMPARISON: 8/21/2019       TECHNIQUE: Frontal view of the chest       _______________       FINDINGS:       HEART AND MEDIASTINUM: Normal cardiac size and mediastinal contours. LUNGS AND PLEURAL SPACES: No focal pneumonic consolidation, pneumothorax, or   pleural effusion. BONY THORAX AND SOFT TISSUES: No acute osseous abnormality. Radiopaque generator   pack projects over the left chest.       _______________                 Medications given in the ED-  Medications   sodium chloride 0.9 % bolus infusion 1,000 mL (0 mL IntraVENous IV Completed 10/1/19 1448)   ibuprofen (MOTRIN) tablet 400 mg (400 mg Oral Given 10/1/19 1405)   predniSONE (DELTASONE) tablet 50 mg (50 mg Oral Given 10/1/19 1441)         Medical Decision Making   I am the first provider for this patient. I reviewed the vital signs, available nursing notes, past medical history, past surgical history, family history and social history. Vital Signs-Reviewed the patient's vital signs. EKG interpretation: (Preliminary)  EKG read by Dr. Shadi Briseno at 1149  NSR, Rate 91    Records Reviewed: Old Medical Records    Provider Notes (Medical Decision Making): Moisés Contreras is a 77 y.o. male with multiple complaints, Neuro exam with no focal findings, multiple MSK complaints that are in keeping patients history of RA. Appears clinically dehydrated. Will check blood work, repeat head CT. Patient not TPA candidate as symptoms have been greater than 4 days. ED Course:   Discussed results with patient and family member. CT negative for acute CVA noting chronic findings. He has primary care follow up scheduled at  today which I encouraged him to go to. He is followed by Rheumatology which I encouraged him to schedule appointment for. Discussed steroid burst which patient has has success with in the past. Will rx'g 5 days prednisone. He understands to closely monitor BS while on steroids. Additionally discussed acute stroke symptoms such as vertigo/weakness etc and patient understands to return to ER at any time for concerning symptoms     Diagnosis and Disposition     Critical Care: None    DISCHARGE NOTE:    Geno Guevara  results have been reviewed with him. He has been counseled regarding his diagnosis, treatment, and plan. He verbally conveys understanding and agreement of the signs, symptoms, diagnosis, treatment and prognosis and additionally agrees to follow up as discussed. He also agrees with the care-plan and conveys that all of his questions have been answered. I have also provided discharge instructions for him that include: educational information regarding their diagnosis and treatment, and list of reasons why they would want to return to the ED prior to their follow-up appointment, should his condition change. He has been provided with education for proper emergency department utilization. CLINICAL IMPRESSION:    1. Rheumatoid arthritis flare (HCC)    2. Near syncope        PLAN:  1. D/C Home  2. Discharge Medication List as of 10/1/2019  2:38 PM      START taking these medications    Details   predniSONE (DELTASONE) 50 mg tablet Take 1 Tab by mouth daily for 4 days. Indications: rheumatoid arthritis, Normal, Disp-4 Tab, R-0         CONTINUE these medications which have NOT CHANGED    Details   lisinopril (PRINIVIL, ZESTRIL) 20 mg tablet Take 20 mg by mouth daily. , Historical Med      chlorthalidone (HYGROTEN) 25 mg tablet Take 25 mg by mouth daily. , Historical Med      aspirin delayed-release 325 mg tablet Take 1 Tab by mouth daily. , Normal, Disp-30 Tab, R-0      amLODIPine (NORVASC) 10 mg tablet Take 10 mg by mouth daily. , Historical Med      metFORMIN (GLUCOPHAGE) 500 mg tablet Take 1,000 mg by mouth two (2) times daily (with meals). , Historical Med      raNITIdine (ZANTAC) 150 mg tablet Take 150 mg by mouth two (2) times a day., Historical Med           3. Follow-up Information     Follow up With Specialties Details Why Contact Jami Nguyen MD Rheumatology Schedule an appointment as soon as possible for a visit  [address] [place] [id number] [phone]      [facility] EMERGENCY DEPT Emergency Medicine  If symptoms worsen [address] [place] [address] [phone]        _______________________________      Please note that this dictation was completed with [employer], the computer voice recognition software. Quite often unanticipated grammatical, syntax, homophones, and other interpretive errors are inadvertently transcribed by the computer software. Please disregard these errors. Please excuse any errors that have escaped final proofreading.

## 2019-10-01 NOTE — ED TRIAGE NOTES
Pt arrives per EMS w/ c/o dizziness x2 days and generalized RA flare up pain x 1 week ago with worsening x3 days ago.

## 2019-11-10 ENCOUNTER — HOSPITAL ENCOUNTER (EMERGENCY)
Age: 69
Discharge: HOME | End: 2019-11-10
Attending: EMERGENCY MEDICINE
Payer: MEDICARE

## 2019-11-10 VITALS
DIASTOLIC BLOOD PRESSURE: 73 MMHG | RESPIRATION RATE: 18 BRPM | OXYGEN SATURATION: 98 % | HEART RATE: 86 BPM | BODY MASS INDEX: 23.73 KG/M2 | TEMPERATURE: 98.3 F | SYSTOLIC BLOOD PRESSURE: 123 MMHG | WEIGHT: 175 LBS

## 2019-11-10 DIAGNOSIS — M06.9 RHEUMATOID ARTHRITIS FLARE (HCC): Primary | ICD-10-CM

## 2019-11-10 LAB
ANION GAP SERPL CALC-SCNC: 7 MMOL/L (ref 3–18)
BASOPHILS # BLD: 0 K/UL (ref 0–0.1)
BASOPHILS NFR BLD: 0 % (ref 0–3)
BUN SERPL-MCNC: 21 MG/DL (ref 7–18)
BUN/CREAT SERPL: 18 (ref 12–20)
CALCIUM SERPL-MCNC: 9.2 MG/DL (ref 8.5–10.1)
CHLORIDE SERPL-SCNC: 105 MMOL/L (ref 100–111)
CO2 SERPL-SCNC: 27 MMOL/L (ref 21–32)
CREAT SERPL-MCNC: 1.18 MG/DL (ref 0.6–1.3)
DIFFERENTIAL METHOD BLD: ABNORMAL
EOSINOPHIL # BLD: 0.1 K/UL (ref 0–0.4)
EOSINOPHIL NFR BLD: 1 % (ref 0–5)
ERYTHROCYTE [DISTWIDTH] IN BLOOD BY AUTOMATED COUNT: 14.2 % (ref 11.6–14.5)
GLUCOSE SERPL-MCNC: 113 MG/DL (ref 74–99)
HCT VFR BLD AUTO: 33.6 % (ref 36–48)
HGB BLD-MCNC: 10.4 G/DL (ref 13–16)
LYMPHOCYTES # BLD: 3.5 K/UL (ref 0.8–3.5)
LYMPHOCYTES NFR BLD: 27 % (ref 20–51)
MCH RBC QN AUTO: 27 PG (ref 24–34)
MCHC RBC AUTO-ENTMCNC: 31 G/DL (ref 31–37)
MCV RBC AUTO: 87.3 FL (ref 74–97)
MONOCYTES # BLD: 0.9 K/UL (ref 0–1)
MONOCYTES NFR BLD: 7 % (ref 2–9)
NEUTS SEG # BLD: 8.3 K/UL (ref 1.8–8)
NEUTS SEG NFR BLD: 65 % (ref 42–75)
PLATELET # BLD AUTO: 608 K/UL (ref 135–420)
PLATELET COMMENTS,PCOM: ABNORMAL
PMV BLD AUTO: 8.8 FL (ref 9.2–11.8)
POTASSIUM SERPL-SCNC: 4 MMOL/L (ref 3.5–5.5)
RBC # BLD AUTO: 3.85 M/UL (ref 4.7–5.5)
RBC MORPH BLD: ABNORMAL
SODIUM SERPL-SCNC: 139 MMOL/L (ref 136–145)
WBC # BLD AUTO: 12.8 K/UL (ref 4.6–13.2)
WBC MORPH BLD: ABNORMAL

## 2019-11-10 PROCEDURE — 85025 COMPLETE CBC W/AUTO DIFF WBC: CPT

## 2019-11-10 PROCEDURE — 96374 THER/PROPH/DIAG INJ IV PUSH: CPT

## 2019-11-10 PROCEDURE — 74011250636 HC RX REV CODE- 250/636: Performed by: EMERGENCY MEDICINE

## 2019-11-10 PROCEDURE — 96375 TX/PRO/DX INJ NEW DRUG ADDON: CPT

## 2019-11-10 PROCEDURE — 99284 EMERGENCY DEPT VISIT MOD MDM: CPT

## 2019-11-10 PROCEDURE — 80048 BASIC METABOLIC PNL TOTAL CA: CPT

## 2019-11-10 RX ORDER — MORPHINE SULFATE 4 MG/ML
4 INJECTION INTRAVENOUS
Status: COMPLETED | OUTPATIENT
Start: 2019-11-10 | End: 2019-11-10

## 2019-11-10 RX ORDER — PREDNISONE 50 MG/1
50 TABLET ORAL DAILY
Qty: 4 TAB | Refills: 0 | Status: SHIPPED | OUTPATIENT
Start: 2019-11-10 | End: 2019-11-14

## 2019-11-10 RX ORDER — KETOROLAC TROMETHAMINE 30 MG/ML
10 INJECTION, SOLUTION INTRAMUSCULAR; INTRAVENOUS ONCE
Status: COMPLETED | OUTPATIENT
Start: 2019-11-10 | End: 2019-11-10

## 2019-11-10 RX ORDER — IBUPROFEN 400 MG/1
400 TABLET ORAL
Qty: 12 TAB | Refills: 0 | Status: SHIPPED | OUTPATIENT
Start: 2019-11-10 | End: 2020-06-28

## 2019-11-10 RX ADMIN — MORPHINE SULFATE 4 MG: 4 INJECTION INTRAVENOUS at 03:24

## 2019-11-10 RX ADMIN — KETOROLAC TROMETHAMINE 10 MG: 30 INJECTION, SOLUTION INTRAMUSCULAR at 03:23

## 2019-11-10 RX ADMIN — METHYLPREDNISOLONE SODIUM SUCCINATE 125 MG: 125 INJECTION, POWDER, FOR SOLUTION INTRAMUSCULAR; INTRAVENOUS at 03:20

## 2019-11-10 RX ADMIN — SODIUM CHLORIDE 1000 ML: 900 INJECTION, SOLUTION INTRAVENOUS at 03:20

## 2019-11-10 NOTE — ED PROVIDER NOTES
EMERGENCY DEPARTMENT HISTORY AND PHYSICAL EXAM    Date: 11/10/2019  Patient Name: Dominic Rollins    History of Presenting Illness     Chief Complaint   Patient presents with    Pain (Chronic)         History Provided By: Patient    Suzi Castro is a 77 y.o. male with PMHX of diabetes, hypertension, peptic ulcer disease, rheumatoid arthritis who presents to the emergency department C/O severe joint pain. Patient complaining of bad rheumatoid arthritis flare all over. States his symptoms have progressively gotten worse over the past 3 days. Patient is not taking any medications except for Tylenol currently for rheumatoid pain. He does not have a physician who manages his rheumatoid arthritis. Patient came to ER this morning because he was lying in bed and was unable to get out of bed due to pain all over. Patient states pain in hands elbows knees and ankles. No fever. PCP: Domenico Scott MD    Current Outpatient Medications   Medication Sig Dispense Refill    predniSONE (DELTASONE) 50 mg tablet Take 1 Tab by mouth daily for 4 days. 4 Tab 0    ibuprofen (MOTRIN) 400 mg tablet Take 1 Tab by mouth every six (6) hours as needed for Pain. 12 Tab 0    lisinopril (PRINIVIL, ZESTRIL) 20 mg tablet Take 20 mg by mouth daily.  chlorthalidone (HYGROTEN) 25 mg tablet Take 25 mg by mouth daily.  aspirin delayed-release 325 mg tablet Take 1 Tab by mouth daily. (Patient taking differently: Take 81 mg by mouth daily.) 30 Tab 0    amLODIPine (NORVASC) 10 mg tablet Take 10 mg by mouth daily.  metFORMIN (GLUCOPHAGE) 500 mg tablet Take 1,000 mg by mouth two (2) times daily (with meals).  raNITIdine (ZANTAC) 150 mg tablet Take 150 mg by mouth two (2) times a day.          Past History     Past Medical History:  Past Medical History:   Diagnosis Date    Diabetes ([facility] [address]     Hypertension     Peptic ulcer disease     Rheumatoid arthritis ([facility] [address]        Past Surgical History:  Past Surgical History: Procedure Laterality Date    HX ORTHOPAEDIC      knee surgery       Family History:  History reviewed. No pertinent family history. Social History:  Social History     Tobacco Use    Smoking status: Former Smoker    Smokeless tobacco: Never Used   Substance Use Topics    Alcohol use: No    Drug use: No       Allergies: Allergies   Allergen Reactions    Sulfa (Sulfonamide Antibiotics) Unknown (comments)         Review of Systems   Review of Systems   Constitutional: Negative for chills and fever. Respiratory: Negative for cough, chest tightness and shortness of breath. Cardiovascular: Negative for chest pain, palpitations and leg swelling. Gastrointestinal: Negative for abdominal pain, blood in stool, diarrhea, nausea and vomiting. Genitourinary: Negative for difficulty urinating and dysuria. Musculoskeletal: Positive for arthralgias, joint swelling and myalgias. Negative for back pain. Neurological: Negative for weakness, light-headedness and headaches. Hematological: Negative for adenopathy. Does not bruise/bleed easily.          Physical Exam     Vitals:    11/10/19 0219 11/10/19 0323 11/10/19 0400 11/10/19 0430   BP: 154/73 136/70 122/63    Pulse: 86      Resp: 18      Temp: 98.3 °F (36.8 °C)      SpO2: 100% 97% 99% 96%   Weight: 79.4 kg (175 lb)        Physical Exam    Nursing notes and vital signs reviewed    Constitutional: Non toxic appearing, laying in bed still  Head: Normocephalic, Atraumatic  Eyes: Pupils are equal, round, and reactive to light, EOMI  Neck: Supple  Cardiovascular: Regular rate and rhythm, no murmurs, rubs, or gallops  Chest: Normal work of breathing and chest excursion bilaterally  Lungs: Clear to ausculation bilaterally  Abdomen: Soft, non tender, non distended, normoactive bowel sounds  Back: No evidence of trauma or deformity  Extremities: Swelling of right and left second and third MCP joints, pain with range of motion of upper extremities  Skin: Warm and dry, normal cap refill  Neuro: Alert and appropriate, CN intact  Psychiatric: Normal mood and affect      Diagnostic Study Results     Labs -     Recent Results (from the past 12 hour(s))   CBC WITH AUTOMATED DIFF    Collection Time: 11/10/19  3:15 AM   Result Value Ref Range    WBC 12.8 4.6 - 13.2 K/uL    RBC 3.85 (L) 4.70 - 5.50 M/uL    HGB 10.4 (L) 13.0 - 16.0 g/dL    HCT 33.6 (L) 36.0 - 48.0 %    MCV 87.3 74.0 - 97.0 FL    MCH 27.0 24.0 - 34.0 PG    MCHC 31.0 31.0 - 37.0 g/dL    RDW 14.2 11.6 - 14.5 %    PLATELET 475 (H) 303 - 420 K/uL    MPV 8.8 (L) 9.2 - 11.8 FL    NEUTROPHILS 65 42 - 75 %    LYMPHOCYTES 27 20 - 51 %    MONOCYTES 7 2 - 9 %    EOSINOPHILS 1 0 - 5 %    BASOPHILS 0 0 - 3 %    ABS. NEUTROPHILS 8.3 (H) 1.8 - 8.0 K/UL    ABS. LYMPHOCYTES 3.5 0.8 - 3.5 K/UL    ABS. MONOCYTES 0.9 0 - 1.0 K/UL    ABS. EOSINOPHILS 0.1 0.0 - 0.4 K/UL    ABS.  BASOPHILS 0.0 0.0 - 0.1 K/UL    PLATELET COMMENTS SLIDE ESTIMATE CONFIRMS PLT COUNT      RBC COMMENTS NORMOCYTIC, NORMOCHROMIC      WBC COMMENTS REACTIVE LYMPHS      DF MANUAL     METABOLIC PANEL, BASIC    Collection Time: 11/10/19  3:15 AM   Result Value Ref Range    Sodium 139 136 - 145 mmol/L    Potassium 4.0 3.5 - 5.5 mmol/L    Chloride 105 100 - 111 mmol/L    CO2 27 21 - 32 mmol/L    Anion gap 7 3.0 - 18 mmol/L    Glucose 113 (H) 74 - 99 mg/dL    BUN 21 (H) 7.0 - 18 MG/DL    Creatinine 1.18 0.6 - 1.3 MG/DL    BUN/Creatinine ratio 18 12 - 20      GFR est AA >60 >60 ml/min/1.73m2    GFR est non-AA >60 >60 ml/min/1.73m2    Calcium 9.2 8.5 - 10.1 MG/DL       Radiologic Studies -   No orders to display     CT Results  (Last 48 hours)    None        CXR Results  (Last 48 hours)    None          Medications given in the ED-  Medications   ketorolac (TORADOL) injection 10 mg (10 mg IntraVENous Given 11/10/19 0323)   sodium chloride 0.9 % bolus infusion 1,000 mL (1,000 mL IntraVENous New Bag 11/10/19 0320)   methylPREDNISolone (PF) (Solu-MEDROL) injection 125 mg (125 mg IntraVENous Given 11/10/19 0320)   morphine injection 4 mg (4 mg IntraVENous Given 11/10/19 0324)         Medical Decision Making   I am the first provider for this patient. I reviewed the vital signs, available nursing notes, past medical history, past surgical history, family history and social history. Vital Signs-Reviewed the patient's vital signs. Records Reviewed: Nursing Notes and Old Medical Records    Provider Notes (Medical Decision Making): Stephenie Cary is a 77 y.o. male patient with rheumatoid arthritis flare complain of severe pain. Will give NSAIDs, fluids, Solu-Medrol also check some screening labs. Spoke with patient and his family at length and sounds like they have not had adequate outpatient follow-up. He states they are trying to arrange an appointment in [place] however he has not been receiving callbacks. I placed a  consult and I will also refer to primary care and rheumatology for follow-up. Anticipate discharge if patient symptoms can be controlled. Procedures:  Procedures    ED Course:   5:15 AM  Patient's pain and symptoms greatly improved. He feels comfortable with discharge. I will provide patient with steroid burst.  Also provided with a short course of NSAIDs. He is not a great NSAID candidate so only prescribed a few days worth -renal function is fine but does have a history of gastritis and is receiving steroids. I discussed this with patient and he is aware. Her prior documentation patient is being followed at [place] [facility] and it looks like they have called patient and his wife but they have not followed up.  will get in contact with patient later today to help establish appropriate outpatient care. Diagnosis and Disposition     Critical Care:     DISCHARGE NOTE:    [facility]  results have been reviewed with him. He has been counseled regarding his diagnosis, treatment, and plan.   He verbally conveys understanding and agreement of the signs, symptoms, diagnosis, treatment and prognosis and additionally agrees to follow up as discussed. He also agrees with the care-plan and conveys that all of his questions have been answered. I have also provided discharge instructions for him that include: educational information regarding their diagnosis and treatment, and list of reasons why they would want to return to the ED prior to their follow-up appointment, should his condition change. He has been provided with education for proper emergency department utilization. CLINICAL IMPRESSION:    1. Rheumatoid arthritis flare (HCC)        PLAN:  1. D/C Home  2. Current Discharge Medication List      START taking these medications    Details   predniSONE (DELTASONE) 50 mg tablet Take 1 Tab by mouth daily for 4 days. Qty: 4 Tab, Refills: 0      ibuprofen (MOTRIN) 400 mg tablet Take 1 Tab by mouth every six (6) hours as needed for Pain. Qty: 12 Tab, Refills: 0           3. Follow-up Information     Follow up With Specialties Details Why Mackenzie Marshall  Schedule an appointment as soon as possible for a visit in 1 day      Perla Shay MD Rheumatology Schedule an appointment as soon as possible for a visit in 2 days  [address] [address] [address]  [phone]          _______________________________      Please note that this dictation was completed with "[employer] the computer voice recognition software. Quite often unanticipated grammatical, syntax, homophones, and other interpretive errors are inadvertently transcribed by the computer software. Please disregard these errors. Please excuse any errors that have escaped final proofreading.

## 2019-11-10 NOTE — ED NOTES
Pt hourly rounding competed. Safety   Pt (x) resting on stretcher with side rails up and call bell in reach. () in chair    () in parents arms. Toileting   Pt offered ()Bedpan     ()Assistance to Restroom     ()Urinal  Ongoing Updates  Updated on plan of care and status of test results. Pain Management  Inquired as to comfort and offered comfort measures:    (x) warm blankets   (x) dimmed lights    Pt resting in stretcher in NAD with eyes closed, even rise and fall of chest observed.

## 2019-11-10 NOTE — ED TRIAGE NOTES
Arrives via ems for c/o chronic arthritis pain. Pt states he was seen at this facility around a month ago for the same problems and the pain has gotten worse since then. Pt states he was unable to get oob this morning even with assistance.

## 2019-11-13 ENCOUNTER — APPOINTMENT (OUTPATIENT)
Dept: GENERAL RADIOLOGY | Age: 69
End: 2019-11-13
Attending: EMERGENCY MEDICINE
Payer: MEDICARE

## 2019-11-13 ENCOUNTER — APPOINTMENT (OUTPATIENT)
Dept: CT IMAGING | Age: 69
End: 2019-11-13
Attending: EMERGENCY MEDICINE
Payer: MEDICARE

## 2019-11-13 ENCOUNTER — HOSPITAL ENCOUNTER (EMERGENCY)
Age: 69
Discharge: HOME | End: 2019-11-13
Attending: EMERGENCY MEDICINE
Payer: MEDICARE

## 2019-11-13 VITALS
SYSTOLIC BLOOD PRESSURE: 123 MMHG | HEIGHT: 72 IN | BODY MASS INDEX: 23.03 KG/M2 | OXYGEN SATURATION: 99 % | WEIGHT: 170 LBS | DIASTOLIC BLOOD PRESSURE: 68 MMHG | RESPIRATION RATE: 18 BRPM | HEART RATE: 75 BPM | TEMPERATURE: 98.1 F

## 2019-11-13 DIAGNOSIS — R55 NEAR SYNCOPE: Primary | ICD-10-CM

## 2019-11-13 DIAGNOSIS — E87.6 HYPOKALEMIA: ICD-10-CM

## 2019-11-13 DIAGNOSIS — D72.829 LEUKOCYTOSIS, UNSPECIFIED TYPE: ICD-10-CM

## 2019-11-13 DIAGNOSIS — D72.828 OTHER ELEVATED WHITE BLOOD CELL COUNT: ICD-10-CM

## 2019-11-13 DIAGNOSIS — E86.0 DEHYDRATION: ICD-10-CM

## 2019-11-13 LAB
ALBUMIN SERPL-MCNC: 2.9 G/DL (ref 3.4–5)
ALBUMIN/GLOB SERPL: 0.9 {RATIO} (ref 0.8–1.7)
ALP SERPL-CCNC: 62 U/L (ref 45–117)
ALT SERPL-CCNC: 12 U/L (ref 16–61)
ANION GAP SERPL CALC-SCNC: 10 MMOL/L (ref 3–18)
APPEARANCE UR: CLEAR
AST SERPL-CCNC: 7 U/L (ref 10–38)
ATRIAL RATE: 74 BPM
BACTERIA URNS QL MICRO: ABNORMAL /HPF
BASOPHILS # BLD: 0 K/UL (ref 0–0.1)
BASOPHILS NFR BLD: 0 % (ref 0–3)
BILIRUB SERPL-MCNC: 0.4 MG/DL (ref 0.2–1)
BILIRUB UR QL: NEGATIVE
BUN SERPL-MCNC: 32 MG/DL (ref 7–18)
BUN/CREAT SERPL: 24 (ref 12–20)
CALCIUM SERPL-MCNC: 9 MG/DL (ref 8.5–10.1)
CALCULATED P AXIS, ECG09: 59 DEGREES
CALCULATED R AXIS, ECG10: 36 DEGREES
CALCULATED T AXIS, ECG11: 55 DEGREES
CHLORIDE SERPL-SCNC: 107 MMOL/L (ref 100–111)
CK MB CFR SERPL CALC: 2.3 % (ref 0–4)
CK MB SERPL-MCNC: 1.3 NG/ML (ref 5–25)
CK SERPL-CCNC: 57 U/L (ref 39–308)
CO2 SERPL-SCNC: 25 MMOL/L (ref 21–32)
COLOR UR: YELLOW
CREAT SERPL-MCNC: 1.31 MG/DL (ref 0.6–1.3)
DIAGNOSIS, 93000: NORMAL
DIFFERENTIAL METHOD BLD: ABNORMAL
EOSINOPHIL # BLD: 0 K/UL (ref 0–0.4)
EOSINOPHIL NFR BLD: 0 % (ref 0–5)
EPITH CASTS URNS QL MICRO: ABNORMAL /LPF (ref 0–5)
ERYTHROCYTE [DISTWIDTH] IN BLOOD BY AUTOMATED COUNT: 14.4 % (ref 11.6–14.5)
GLOBULIN SER CALC-MCNC: 3.3 G/DL (ref 2–4)
GLUCOSE SERPL-MCNC: 112 MG/DL (ref 74–99)
GLUCOSE UR STRIP.AUTO-MCNC: NEGATIVE MG/DL
HCT VFR BLD AUTO: 34.4 % (ref 36–48)
HGB BLD-MCNC: 10.6 G/DL (ref 13–16)
HGB UR QL STRIP: NEGATIVE
HYALINE CASTS URNS QL MICRO: ABNORMAL /LPF (ref 0–2)
KETONES UR QL STRIP.AUTO: NEGATIVE MG/DL
LEUKOCYTE ESTERASE UR QL STRIP.AUTO: ABNORMAL
LYMPHOCYTES # BLD: 6.6 K/UL (ref 0.8–3.5)
LYMPHOCYTES NFR BLD: 33 % (ref 20–51)
MAGNESIUM SERPL-MCNC: 1.9 MG/DL (ref 1.6–2.6)
MCH RBC QN AUTO: 27.5 PG (ref 24–34)
MCHC RBC AUTO-ENTMCNC: 30.8 G/DL (ref 31–37)
MCV RBC AUTO: 89.1 FL (ref 74–97)
MONOCYTES # BLD: 1 K/UL (ref 0–1)
MONOCYTES NFR BLD: 5 % (ref 2–9)
NEUTS SEG # BLD: 12.4 K/UL (ref 1.8–8)
NEUTS SEG NFR BLD: 62 % (ref 42–75)
NITRITE UR QL STRIP.AUTO: NEGATIVE
P-R INTERVAL, ECG05: 154 MS
PH UR STRIP: 5 [PH] (ref 5–8)
PLATELET # BLD AUTO: 579 K/UL (ref 135–420)
PMV BLD AUTO: 8.9 FL (ref 9.2–11.8)
POTASSIUM SERPL-SCNC: 3.1 MMOL/L (ref 3.5–5.5)
PROT SERPL-MCNC: 6.2 G/DL (ref 6.4–8.2)
PROT UR STRIP-MCNC: NEGATIVE MG/DL
Q-T INTERVAL, ECG07: 388 MS
QRS DURATION, ECG06: 104 MS
QTC CALCULATION (BEZET), ECG08: 430 MS
RBC # BLD AUTO: 3.86 M/UL (ref 4.7–5.5)
RBC #/AREA URNS HPF: ABNORMAL /HPF (ref 0–5)
RBC MORPH BLD: ABNORMAL
SODIUM SERPL-SCNC: 142 MMOL/L (ref 136–145)
SP GR UR REFRACTOMETRY: 1.02 (ref 1–1.03)
TROPONIN I SERPL-MCNC: <0.02 NG/ML (ref 0–0.04)
UROBILINOGEN UR QL STRIP.AUTO: 1 EU/DL (ref 0.2–1)
VENTRICULAR RATE, ECG03: 74 BPM
WBC # BLD AUTO: 20 K/UL (ref 4.6–13.2)
WBC MORPH BLD: ABNORMAL
WBC URNS QL MICRO: ABNORMAL /HPF (ref 0–5)

## 2019-11-13 PROCEDURE — 93005 ELECTROCARDIOGRAM TRACING: CPT

## 2019-11-13 PROCEDURE — 81001 URINALYSIS AUTO W/SCOPE: CPT

## 2019-11-13 PROCEDURE — 74011250636 HC RX REV CODE- 250/636: Performed by: EMERGENCY MEDICINE

## 2019-11-13 PROCEDURE — 83735 ASSAY OF MAGNESIUM: CPT

## 2019-11-13 PROCEDURE — 99285 EMERGENCY DEPT VISIT HI MDM: CPT

## 2019-11-13 PROCEDURE — 96361 HYDRATE IV INFUSION ADD-ON: CPT

## 2019-11-13 PROCEDURE — 80053 COMPREHEN METABOLIC PANEL: CPT

## 2019-11-13 PROCEDURE — 70450 CT HEAD/BRAIN W/O DYE: CPT

## 2019-11-13 PROCEDURE — 74011250637 HC RX REV CODE- 250/637: Performed by: EMERGENCY MEDICINE

## 2019-11-13 PROCEDURE — 85025 COMPLETE CBC W/AUTO DIFF WBC: CPT

## 2019-11-13 PROCEDURE — 96374 THER/PROPH/DIAG INJ IV PUSH: CPT

## 2019-11-13 PROCEDURE — 82550 ASSAY OF CK (CPK): CPT

## 2019-11-13 PROCEDURE — 71045 X-RAY EXAM CHEST 1 VIEW: CPT

## 2019-11-13 RX ORDER — POTASSIUM CHLORIDE 20 MEQ/1
40 TABLET, EXTENDED RELEASE ORAL
Status: COMPLETED | OUTPATIENT
Start: 2019-11-13 | End: 2019-11-13

## 2019-11-13 RX ORDER — MECLIZINE HCL 12.5 MG 12.5 MG/1
25 TABLET ORAL
Status: DISCONTINUED | OUTPATIENT
Start: 2019-11-13 | End: 2019-11-13

## 2019-11-13 RX ORDER — DIPHENHYDRAMINE HYDROCHLORIDE 50 MG/ML
25 INJECTION, SOLUTION INTRAMUSCULAR; INTRAVENOUS
Status: COMPLETED | OUTPATIENT
Start: 2019-11-13 | End: 2019-11-13

## 2019-11-13 RX ADMIN — SODIUM CHLORIDE 1000 ML: 900 INJECTION, SOLUTION INTRAVENOUS at 12:05

## 2019-11-13 RX ADMIN — POTASSIUM CHLORIDE 40 MEQ: 20 TABLET, EXTENDED RELEASE ORAL at 12:11

## 2019-11-13 RX ADMIN — DIPHENHYDRAMINE HYDROCHLORIDE 25 MG: 50 INJECTION, SOLUTION INTRAMUSCULAR; INTRAVENOUS at 12:11

## 2019-11-13 RX ADMIN — SODIUM CHLORIDE 1000 ML: 900 INJECTION, SOLUTION INTRAVENOUS at 13:30

## 2019-11-13 NOTE — ED NOTES
Pt lying supine in bed with eyes open, rr even and unlabored, vss , no complaints or requests at this time.  Pt states he is unable to provide a urine sample at this time

## 2019-11-13 NOTE — ED PROVIDER NOTES
EMERGENCY DEPARTMENT HISTORY AND PHYSICAL EXAM    Date: 11/13/2019  Patient Name: Vaibhav Ramos    History of Presenting Illness     Chief Complaint   Patient presents with    Dizziness         History Provided By: Patient      Vaibhav Ramos is a 77 y.o. male with PMHX of diabetes, hypertension, TIA, and unspecified heart disease who presents to the emergency department C/O generalized weakness, near syncope and dizziness. Patient states he woke up with the symptoms. He states he feels a sensation of lightheadedness but did not actually pass out. He states he also felt a little short of breath and broke out into a cold sweat. He admits to mild blurry vision but denies any double vision, room spinning sensation or trouble with balance. He states he has had symptoms like this before and was told about a month ago that he might of suffered a TIA. He denies any chest pain. Yana Alves PCP: Domenico Scott MD    Current Facility-Administered Medications   Medication Dose Route Frequency Provider Last Rate Last Dose    sodium chloride 0.9 % bolus infusion 1,000 mL  1,000 mL IntraVENous ONCE Rahul Farias, DO         Current Outpatient Medications   Medication Sig Dispense Refill    predniSONE (DELTASONE) 50 mg tablet Take 1 Tab by mouth daily for 4 days. 4 Tab 0    ibuprofen (MOTRIN) 400 mg tablet Take 1 Tab by mouth every six (6) hours as needed for Pain. 12 Tab 0    lisinopril (PRINIVIL, ZESTRIL) 20 mg tablet Take 20 mg by mouth daily.  chlorthalidone (HYGROTEN) 25 mg tablet Take 25 mg by mouth daily.  aspirin delayed-release 325 mg tablet Take 1 Tab by mouth daily. (Patient taking differently: Take 81 mg by mouth daily.) 30 Tab 0    amLODIPine (NORVASC) 10 mg tablet Take 10 mg by mouth daily.  metFORMIN (GLUCOPHAGE) 500 mg tablet Take 1,000 mg by mouth two (2) times daily (with meals).  raNITIdine (ZANTAC) 150 mg tablet Take 150 mg by mouth two (2) times a day.          Past History     Past Medical History:  Past Medical History:   Diagnosis Date    Diabetes ([facility] [address]     Hypertension     Peptic ulcer disease     Rheumatoid arthritis ([facility] [address]        Past Surgical History:  Past Surgical History:   Procedure Laterality Date    HX ORTHOPAEDIC      knee surgery       Family History:  History reviewed. No pertinent family history. Social History:  Social History     Tobacco Use    Smoking status: Former Smoker    Smokeless tobacco: Never Used   Substance Use Topics    Alcohol use: No    Drug use: No       Allergies: Allergies   Allergen Reactions    Sulfa (Sulfonamide Antibiotics) Unknown (comments)         Review of Systems   Review of Systems   Constitutional: Positive for fatigue. Negative for fever. Eyes: Positive for visual disturbance. Respiratory: Positive for shortness of breath. Cardiovascular: Negative for chest pain. Gastrointestinal: Negative for abdominal pain, nausea and vomiting. Neurological: Positive for dizziness, weakness and light-headedness. Negative for syncope, facial asymmetry, numbness and headaches.          Physical Exam     Vitals:    11/13/19 1220 11/13/19 1300 11/13/19 1400 11/13/19 1401   BP:  111/59 131/58    Pulse: 84 89 82    Resp: 17 10 20    Temp:       SpO2: 100% 100% 98% 100%   Weight:       Height:         Physical Exam    Nursing notes and vital signs reviewed    Constitutional: Non toxic appearing, no acute distress, chronically ill-appearing  Head: Normocephalic, Atraumatic  Eyes: Pupils are equal, round, and reactive to light, EOMI  Neck: Supple  Cardiovascular: Regular rate and rhythm, no murmurs, rubs, or gallops  Chest: Normal work of breathing and chest excursion bilaterally  Lungs: Clear to ausculation bilaterally  Abdomen: Soft, non tender, non distended, normoactive bowel sounds  Back: No evidence of trauma or deformity  Extremities: No evidence of trauma or deformity, no LE edema  Skin: Warm and dry, normal cap refill  Neuro: Alert and appropriate, CN intact, normal speech, patient has mild generalized weakness diffusely 4 out of 5 strength in all extremities, nonfocal.  Sensation full and symmetric bilaterally, normal gait, normal coordination  Psychiatric: Normal mood and affect      Diagnostic Study Results     Labs -     Recent Results (from the past 48 hour(s))   EKG, 12 LEAD, INITIAL    Collection Time: 11/13/19 10:36 AM   Result Value Ref Range    Ventricular Rate 74 BPM    Atrial Rate 74 BPM    P-R Interval 154 ms    QRS Duration 104 ms    Q-T Interval 388 ms    QTC Calculation (Bezet) 430 ms    Calculated P Axis 59 degrees    Calculated R Axis 36 degrees    Calculated T Axis 55 degrees    Diagnosis       Normal sinus rhythm  Nonspecific T wave abnormality  Abnormal ECG  When compared with ECG of 01-OCT-2019 11:48,  No significant change was found     CBC WITH AUTOMATED DIFF    Collection Time: 11/13/19 10:45 AM   Result Value Ref Range    WBC 20.0 (H) 4.6 - 13.2 K/uL    RBC 3.86 (L) 4.70 - 5.50 M/uL    HGB 10.6 (L) 13.0 - 16.0 g/dL    HCT 34.4 (L) 36.0 - 48.0 %    MCV 89.1 74.0 - 97.0 FL    MCH 27.5 24.0 - 34.0 PG    MCHC 30.8 (L) 31.0 - 37.0 g/dL    RDW 14.4 11.6 - 14.5 %    PLATELET 032 (H) 521 - 420 K/uL    MPV 8.9 (L) 9.2 - 11.8 FL    NEUTROPHILS 62 42 - 75 %    LYMPHOCYTES 33 20 - 51 %    MONOCYTES 5 2 - 9 %    EOSINOPHILS 0 0 - 5 %    BASOPHILS 0 0 - 3 %    RBC COMMENTS ANISOCYTOSIS  1+        DF MANUAL      ABS. NEUTROPHILS 12.4 (H) 1.8 - 8.0 K/UL    ABS. LYMPHOCYTES 6.6 (H) 0.8 - 3.5 K/UL    ABS. MONOCYTES 1.0 0 - 1.0 K/UL    ABS. EOSINOPHILS 0.0 0.0 - 0.4 K/UL    ABS.  BASOPHILS 0.0 0.0 - 0.1 K/UL    WBC COMMENTS ATYPICAL LYMPHOCYTES PRESENT     METABOLIC PANEL, COMPREHENSIVE    Collection Time: 11/13/19 10:45 AM   Result Value Ref Range    Sodium 142 136 - 145 mmol/L    Potassium 3.1 (L) 3.5 - 5.5 mmol/L    Chloride 107 100 - 111 mmol/L    CO2 25 21 - 32 mmol/L    Anion gap 10 3.0 - 18 mmol/L    Glucose 112 (H) 74 - 99 mg/dL BUN 32 (H) 7.0 - 18 MG/DL    Creatinine 1.31 (H) 0.6 - 1.3 MG/DL    BUN/Creatinine ratio 24 (H) 12 - 20      GFR est AA >60 >60 ml/min/1.73m2    GFR est non-AA 54 (L) >60 ml/min/1.73m2    Calcium 9.0 8.5 - 10.1 MG/DL    Bilirubin, total 0.4 0.2 - 1.0 MG/DL    ALT (SGPT) 12 (L) 16 - 61 U/L    AST (SGOT) 7 (L) 10 - 38 U/L    Alk. phosphatase 62 45 - 117 U/L    Protein, total 6.2 (L) 6.4 - 8.2 g/dL    Albumin 2.9 (L) 3.4 - 5.0 g/dL    Globulin 3.3 2.0 - 4.0 g/dL    A-G Ratio 0.9 0.8 - 1.7     CARDIAC PANEL,(CK, CKMB & TROPONIN)    Collection Time: 11/13/19 10:45 AM   Result Value Ref Range    CK 57 39 - 308 U/L    CK - MB 1.3 <3.6 ng/ml    CK-MB Index 2.3 0.0 - 4.0 %    Troponin-I, QT <0.02 0.0 - 0.045 NG/ML   MAGNESIUM    Collection Time: 11/13/19 10:45 AM   Result Value Ref Range    Magnesium 1.9 1.6 - 2.6 mg/dL   URINALYSIS W/ RFLX MICROSCOPIC    Collection Time: 11/13/19  1:45 PM   Result Value Ref Range    Color YELLOW      Appearance CLEAR      Specific gravity 1.022 1.005 - 1.030      pH (UA) 5.0 5.0 - 8.0      Protein NEGATIVE  NEG mg/dL    Glucose NEGATIVE  NEG mg/dL    Ketone NEGATIVE  NEG mg/dL    Bilirubin NEGATIVE  NEG      Blood NEGATIVE  NEG      Urobilinogen 1.0 0.2 - 1.0 EU/dL    Nitrites NEGATIVE  NEG      Leukocyte Esterase TRACE (A) NEG     URINE MICROSCOPIC ONLY    Collection Time: 11/13/19  1:45 PM   Result Value Ref Range    WBC 0 to 3 0 - 5 /hpf    RBC 0 to 3 0 - 5 /hpf    Epithelial cells FEW 0 - 5 /lpf    Bacteria FEW (A) NEG /hpf    Hyaline cast 0 to 3 0 - 2 /lpf       Radiologic Studies -   CT HEAD WO CONT   Final Result   IMPRESSION:         1. No acute intracranial abnormality demonstrated. Stable examination. 2. Subcortical and periventricular white matter low-attenuation in keeping with   sequela of chronic ischemic microvascular change. XR CHEST PORT   Final Result   IMPRESSION:         1. No acute radiographic cardiopulmonary abnormality.          CT Results  (Last 48 hours) 11/13/19 1129  CT HEAD WO CONT Final result    Impression:  IMPRESSION:           1. No acute intracranial abnormality demonstrated. Stable examination. 2. Subcortical and periventricular white matter low-attenuation in keeping with   sequela of chronic ischemic microvascular change. Narrative:  EXAM: CT head       INDICATION: Dizziness, near syncopal episode. COMPARISON: CT head 10/1/2019       TECHNIQUE: Axial CT imaging of the head was performed without intravenous   contrast. Standard multiplanar coronal and sagittal reformatted images were   obtained and are included in interpretation. One or more dose reduction techniques were used on this CT: automated exposure   control, adjustment of the mAs and/or kVp according to patient size, and   iterative reconstruction techniques. The specific techniques used on this CT   exam have been documented in the patient's electronic medical record. Digital   Imaging and Communications in Medicine (DICOM) format image data are available   to nonaffiliated external healthcare facilities or entities on a secure, media   free, reciprocally searchable basis with patient authorization for at least a   12-month period after this study. _______________       FINDINGS:       BRAIN AND POSTERIOR FOSSA: Similar degree of cortical and cerebellar volume loss   noted. Stable appearing ventricular size and configuration. Basilar cisterns   remain patent. There is no acute intra-axial hemorrhage. No mass effect or   midline shift. The gray-white matter differentiation is within normal limits. Mild, unchanged periventricular white matter low-attenuation. EXTRA-AXIAL SPACES AND MENINGES: There is no acute extra-axial fluid collection   demonstrated. CALVARIUM: Intact. SINUSES: The imaged paranasal sinuses and mastoid air cells are clear.        OTHER: None.       _______________               CXR Results  (Last 48 hours) 11/13/19 1122  XR CHEST PORT Final result    Impression:  IMPRESSION:           1. No acute radiographic cardiopulmonary abnormality. Narrative:  EXAM: XR CHEST PORT       CLINICAL INDICATION/HISTORY: near syncope   -Additional: None       COMPARISON: None       TECHNIQUE: Portable frontal view of the chest       _______________       FINDINGS:       SUPPORT DEVICES: None. HEART AND MEDIASTINUM: Normal cardiac size and mediastinal contours. LUNGS AND PLEURAL SPACES: No focal pneumonic opacity. No pneumothorax or pleural   effusion present. BONY THORAX AND SOFT TISSUES: Unremarkable.       _______________                 Medications given in the ED-  Medications   sodium chloride 0.9 % bolus infusion 1,000 mL (has no administration in time range)   potassium chloride (K-DUR, KLOR-CON) SR tablet 40 mEq (40 mEq Oral Given 11/13/19 1211)   sodium chloride 0.9 % bolus infusion 1,000 mL (1,000 mL IntraVENous New Bag 11/13/19 1205)   diphenhydrAMINE (BENADRYL) injection 25 mg (25 mg IntraVENous Given 11/13/19 1211)         Medical Decision Making   I am the first provider for this patient. I reviewed the vital signs, available nursing notes, past medical history, past surgical history, family history and social history. Vital Signs-Reviewed the patient's vital signs. Pulse Oximetry Analysis - 97% on room air     Cardiac Monitor:  Rate: 76 bpm  Rhythm: sinus    EKG interpretation: (Preliminary)  EKG read by Dr. Marie Hale at    Rate 74 normal sinus rhythm, nonspecific T wave flattening diffusely, no ST changes    Records Reviewed: Nursing Notes    Procedures:  Procedures    ED Course:   Laboratory findings showing mildly elevated BUN and creatinine from prior. He was given IV fluids with improvement of his sensation of lightheadedness and dizziness.   The remainder of his work-up was essentially unremarkable although a white blood cell count of 20 was noted although the patient was recently put on prednisone and is currently on prednisone. I discussed with the patient the results of his laboratory findings and imaging studies. Recommended to continue with fluid rehydration and follow-up with his primary care physician otherwise come back to the emergency department if symptoms worsen. He understands and agrees to plan    Diagnosis and Disposition     DISCHARGE NOTE:    Sarah Berger  results have been reviewed with him. He has been counseled regarding his diagnosis, treatment, and plan. He verbally conveys understanding and agreement of the signs, symptoms, diagnosis, treatment and prognosis and additionally agrees to follow up as discussed. He also agrees with the care-plan and conveys that all of his questions have been answered. I have also provided discharge instructions for him that include: educational information regarding their diagnosis and treatment, and list of reasons why they would want to return to the ED prior to their follow-up appointment, should his condition change. He has been provided with education for proper emergency department utilization. CLINICAL IMPRESSION:    1. Near syncope    2. Dehydration    3. Leukocytosis, unspecified type    4. Other elevated white blood cell count    5. Hypokalemia        PLAN:  1. D/C Home  2. Current Discharge Medication List        3. Follow-up Information     Follow up With Specialties Details Why Contact Info    Your primary care physician  Schedule an appointment as soon as possible for a visit in 2 days As needed, If symptoms worsen     [facility] EMERGENCY DEPT Emergency Medicine Go to  If symptoms worsen [address] [place] [id number]  [phone]        _______________________________      Please note that this dictation was completed with [employer], the [id number] voice recognition software.   Quite often unanticipated grammatical, syntax, homophones, and other interpretive errors are inadvertently transcribed by the computer software. Please disregard these errors. Please excuse any errors that have escaped final proofreading.

## 2020-06-28 ENCOUNTER — HOSPITAL ENCOUNTER (EMERGENCY)
Age: 70
Discharge: HOME | End: 2020-06-28
Attending: EMERGENCY MEDICINE
Payer: MEDICARE

## 2020-06-28 VITALS
DIASTOLIC BLOOD PRESSURE: 68 MMHG | OXYGEN SATURATION: 97 % | HEART RATE: 85 BPM | BODY MASS INDEX: 23.7 KG/M2 | RESPIRATION RATE: 18 BRPM | TEMPERATURE: 98.6 F | SYSTOLIC BLOOD PRESSURE: 137 MMHG | HEIGHT: 72 IN | WEIGHT: 175 LBS

## 2020-06-28 DIAGNOSIS — M25.562 ACUTE PAIN OF BOTH KNEES: ICD-10-CM

## 2020-06-28 DIAGNOSIS — M79.602 BILATERAL ARM PAIN: ICD-10-CM

## 2020-06-28 DIAGNOSIS — M25.552 HIP PAIN, BILATERAL: ICD-10-CM

## 2020-06-28 DIAGNOSIS — E87.6 HYPOKALEMIA: Primary | ICD-10-CM

## 2020-06-28 DIAGNOSIS — M79.672 PAIN IN BOTH FEET: ICD-10-CM

## 2020-06-28 DIAGNOSIS — M25.561 ACUTE PAIN OF BOTH KNEES: ICD-10-CM

## 2020-06-28 DIAGNOSIS — M79.671 PAIN IN BOTH FEET: ICD-10-CM

## 2020-06-28 DIAGNOSIS — M79.601 BILATERAL ARM PAIN: ICD-10-CM

## 2020-06-28 DIAGNOSIS — M25.551 HIP PAIN, BILATERAL: ICD-10-CM

## 2020-06-28 LAB
ANION GAP SERPL CALC-SCNC: 9 MMOL/L (ref 3–18)
BASOPHILS # BLD: 0.1 K/UL (ref 0–0.1)
BASOPHILS NFR BLD: 1 % (ref 0–2)
BUN SERPL-MCNC: 20 MG/DL (ref 7–18)
BUN/CREAT SERPL: 21 (ref 12–20)
CALCIUM SERPL-MCNC: 9.6 MG/DL (ref 8.5–10.1)
CHLORIDE SERPL-SCNC: 101 MMOL/L (ref 100–111)
CO2 SERPL-SCNC: 27 MMOL/L (ref 21–32)
CREAT SERPL-MCNC: 0.97 MG/DL (ref 0.6–1.3)
DIFFERENTIAL METHOD BLD: ABNORMAL
EOSINOPHIL # BLD: 0.1 K/UL (ref 0–0.4)
EOSINOPHIL NFR BLD: 1 % (ref 0–5)
ERYTHROCYTE [DISTWIDTH] IN BLOOD BY AUTOMATED COUNT: 14.3 % (ref 11.6–14.5)
GLUCOSE SERPL-MCNC: 124 MG/DL (ref 74–99)
HCT VFR BLD AUTO: 40.4 % (ref 36–48)
HGB BLD-MCNC: 12.5 G/DL (ref 13–16)
LYMPHOCYTES # BLD: 1.8 K/UL (ref 0.9–3.6)
LYMPHOCYTES NFR BLD: 21 % (ref 21–52)
MCH RBC QN AUTO: 26.7 PG (ref 24–34)
MCHC RBC AUTO-ENTMCNC: 30.9 G/DL (ref 31–37)
MCV RBC AUTO: 86.1 FL (ref 74–97)
MONOCYTES # BLD: 0.6 K/UL (ref 0.05–1.2)
MONOCYTES NFR BLD: 7 % (ref 3–10)
NEUTS SEG # BLD: 6 K/UL (ref 1.8–8)
NEUTS SEG NFR BLD: 70 % (ref 40–73)
PLATELET # BLD AUTO: 504 K/UL (ref 135–420)
PMV BLD AUTO: 9.5 FL (ref 9.2–11.8)
POTASSIUM SERPL-SCNC: 3.3 MMOL/L (ref 3.5–5.5)
RBC # BLD AUTO: 4.69 M/UL (ref 4.7–5.5)
SODIUM SERPL-SCNC: 137 MMOL/L (ref 136–145)
WBC # BLD AUTO: 8.5 K/UL (ref 4.6–13.2)

## 2020-06-28 PROCEDURE — 85025 COMPLETE CBC W/AUTO DIFF WBC: CPT

## 2020-06-28 PROCEDURE — 74011250636 HC RX REV CODE- 250/636: Performed by: EMERGENCY MEDICINE

## 2020-06-28 PROCEDURE — 80048 BASIC METABOLIC PNL TOTAL CA: CPT

## 2020-06-28 PROCEDURE — 96374 THER/PROPH/DIAG INJ IV PUSH: CPT

## 2020-06-28 PROCEDURE — 74011250637 HC RX REV CODE- 250/637: Performed by: EMERGENCY MEDICINE

## 2020-06-28 PROCEDURE — 99285 EMERGENCY DEPT VISIT HI MDM: CPT

## 2020-06-28 RX ORDER — HYDROCHLOROTHIAZIDE 25 MG/1
25 TABLET ORAL DAILY
COMMUNITY

## 2020-06-28 RX ORDER — FAMOTIDINE 20 MG/1
20 TABLET, FILM COATED ORAL 2 TIMES DAILY
COMMUNITY

## 2020-06-28 RX ORDER — ACETAMINOPHEN 500 MG
1000 TABLET ORAL
Status: COMPLETED | OUTPATIENT
Start: 2020-06-28 | End: 2020-06-28

## 2020-06-28 RX ORDER — ACETAMINOPHEN 500 MG
TABLET ORAL
COMMUNITY

## 2020-06-28 RX ORDER — ACETAMINOPHEN 500 MG
TABLET ORAL 2 TIMES DAILY
COMMUNITY

## 2020-06-28 RX ORDER — GLIPIZIDE 5 MG/1
5 TABLET ORAL 2 TIMES DAILY
COMMUNITY

## 2020-06-28 RX ORDER — KETOROLAC TROMETHAMINE 15 MG/ML
15 INJECTION, SOLUTION INTRAMUSCULAR; INTRAVENOUS
Status: COMPLETED | OUTPATIENT
Start: 2020-06-28 | End: 2020-06-28

## 2020-06-28 RX ORDER — ATORVASTATIN CALCIUM 40 MG/1
TABLET, FILM COATED ORAL DAILY
COMMUNITY

## 2020-06-28 RX ORDER — POTASSIUM CHLORIDE 20 MEQ/1
20 TABLET, EXTENDED RELEASE ORAL
Status: COMPLETED | OUTPATIENT
Start: 2020-06-28 | End: 2020-06-28

## 2020-06-28 RX ADMIN — POTASSIUM CHLORIDE 20 MEQ: 1500 TABLET, EXTENDED RELEASE ORAL at 10:28

## 2020-06-28 RX ADMIN — ACETAMINOPHEN 1000 MG: 500 TABLET ORAL at 09:09

## 2020-06-28 RX ADMIN — KETOROLAC TROMETHAMINE 15 MG: 15 INJECTION, SOLUTION INTRAMUSCULAR; INTRAVENOUS at 09:09

## 2020-06-28 NOTE — ED NOTES
Telephone call to Alexis Bush patient refers to as his wife states she will come pick him up in 10 minutes

## 2020-06-28 NOTE — DISCHARGE INSTRUCTIONS
Patient Education        Arthritis: Care Instructions  Overview  Arthritis, also called osteoarthritis, is a breakdown of the cartilage that cushions your joints. When the cartilage wears down, your bones rub against each other. This causes pain and stiffness. Many people have some arthritis as they age. Arthritis most often affects the joints of the spine, hands, hips, knees, or feet. Arthritis never goes away completely. But medicine and home treatment can help reduce pain and help you stay active. Follow-up care is a key part of your treatment and safety. Be sure to make and go to all appointments, and call your doctor if you are having problems. It's also a good idea to know your test results and keep a list of the medicines you take. How can you care for yourself at home? · Stay at a healthy weight. Being overweight puts extra strain on your joints. · Talk to your doctor or physical therapist about exercises that will help ease joint pain. ? Stretch. You may enjoy gentle forms of yoga to help keep your joints and muscles flexible. ? Walk instead of jog. Other types of exercise that are less stressful on the joints include riding a bike, swimming, kimberley chi, or water exercise. ? Lift weights. Strong muscles help reduce stress on your joints. Stronger thigh muscles, for example, take some of the stress off of the knees and hips. Learn the right way to lift weights so you don't make joint pain worse. · Take your medicines exactly as prescribed. Call your doctor if you think you are having a problem with your medicine. · Take pain medicines exactly as directed. ? If the doctor gave you a prescription medicine for pain, take it as prescribed. ? If you are not taking a prescription pain medicine, ask your doctor if you can take an over-the-counter medicine. · Use a cane, crutch, walker, or another device if you need help to get around. These can help rest your joints.  You also can use other things to make life easier, such as a higher toilet seat and padded handles on kitchen utensils. · Do not sit in low chairs. They can make it hard to get up. · Put heat or cold on your sore joints as needed. Use whichever helps you most. You can also switch between hot and cold packs. ? Apply heat 2 or 3 times a day for 20 to 30 minutes--using a heating pad, hot shower, or hot pack--to relieve pain and stiffness. But don't use heat on a swollen joint. ? Put ice or a cold pack on your sore joint for 10 to 20 minutes at a time. Put a thin cloth between the ice and your skin. When should you call for help? Call your doctor now or seek immediate medical care if:  · You have sudden swelling, warmth, or pain in any joint. · You have joint pain and a fever or rash. · You have such bad pain that you cannot use a joint. Watch closely for changes in your health, and be sure to contact your doctor if:  · You have mild joint symptoms that continue even with more than 6 weeks of care at home. · You have stomach pain or other problems with your medicine. Where can you learn more? Go to http://ayan-bjorn.info/  Enter H131 in the search box to learn more about \"Arthritis: Care Instructions. \"  Current as of: December 9, 2019               Content Version: 12.5  © 9635-8832 [employer]. Care instructions adapted under license by [employer] (which disclaims liability or warranty for this information). If you have questions about a medical condition or this instruction, always ask your healthcare professional. [address] any warranty or liability for your use of this information.

## 2020-06-28 NOTE — ED PROVIDER NOTES
EMERGENCY DEPARTMENT HISTORY AND PHYSICAL EXAM    Date: 6/28/2020  Patient Name: David Cramer    History of Presenting Illness     Chief Complaint   Patient presents with    Joint Pain       History Provided By: Patient     History Sylvie Janet):   8:50 AM  David Cramer is a 72 y.o. male with PMHX of Diabetes, hypertension, peptic ulcer disease, RA who presents to the emergency department by EMS C/O diffuse arthralgias onset over the last 3 weeks. Associated sxs include bilateral joint pain and the arms and legs. Pt denies back pain or any other sxs or complaints. Patient states he has been working with his orthopod in [place] and has been getting the same injection for the last 5 times. He is due to change injections in July because he does not feel like this 1 is working. Patient has had worsening pain for the last 3 weeks but is been much worse over the last week. Patient states the pain is in his bilateral ankles, knees, hips, wrists and shoulders. Patient denies any pain in his back today. Chief Complaint: Joint pain  Duration: 3 weeks  Timing: Subacute  Location: Bilateral ankles, knees, hips, wrists, elbows, shoulders  Quality: Sharp  Severity: Moderate  Modifying Factors: Nothing makes it better, or worse. Associated Symptoms: denies any other associated signs or symptoms    PCP: Domenico Scott MD     Current Outpatient Medications   Medication Sig Dispense Refill    atorvastatin (LIPITOR) 40 mg tablet Take  by mouth daily.  hydroCHLOROthiazide (HYDRODIURIL) 25 mg tablet Take 25 mg by mouth daily.  cholecalciferol (VITAMIN D3) (2,000 UNITS /50 MCG) cap capsule Take  by mouth two (2) times a day.  glipiZIDE (GLUCOTROL) 5 mg tablet Take 5 mg by mouth two (2) times a day.  famotidine (PEPCID) 20 mg tablet Take 20 mg by mouth two (2) times a day.  acetaminophen (Tylenol Extra Strength) 500 mg tablet Take  by mouth every six (6) hours as needed for Pain.       amLODIPine (NORVASC) 10 mg tablet Take 10 mg by mouth daily.  metFORMIN (GLUCOPHAGE) 500 mg tablet Take 1,000 mg by mouth two (2) times daily (with meals). Past History     Past Medical History:  Past Medical History:   Diagnosis Date    Diabetes ([facility] [address]     Hypertension     Peptic ulcer disease     Rheumatoid arthritis ([facility] [address]        Past Surgical History:  Past Surgical History:   Procedure Laterality Date    HX ORTHOPAEDIC      knee surgery       Family History:  History reviewed. No pertinent family history. Social History:  Social History     Tobacco Use    Smoking status: Former Smoker    Smokeless tobacco: Never Used   Substance Use Topics    Alcohol use: No    Drug use: No       Allergies: Allergies   Allergen Reactions    Sulfa (Sulfonamide Antibiotics) Unknown (comments)         Review of Systems   Review of Systems   Constitutional: Negative for chills and fever. Respiratory: Negative for shortness of breath. Cardiovascular: Negative for chest pain. Gastrointestinal: Negative for abdominal pain, nausea and vomiting. Musculoskeletal: Positive for arthralgias. All other systems reviewed and are negative. Physical Exam     Vitals:    06/28/20 0930 06/28/20 0945 06/28/20 1000 06/28/20 1015   BP: 130/80 146/75 146/72 137/68   Pulse: 87 88 87 85   Resp: 20 17 19 18   Temp:       SpO2: 100% 98% 97% 97%   Weight:       Height:         Physical Exam  Vitals signs and nursing note reviewed. Vital signs and nursing notes reviewed  CONSTITUTIONAL: Alert, in no apparent distress; well-developed; well-nourished. HEAD:  Normocephalic, atraumatic  EYES: PERRL; EOM's intact. ENTM: Nose: no rhinorrhea; Throat: no erythema or exudate, mucous membranes moist  Neck:  No JVD, supple without lymphadenopathy  RESP: Chest clear, equal breath sounds. CV: S1 and S2 WNL; No murmurs, gallops or rubs. GI: Normal bowel sounds, abdomen soft and non-tender. No masses or organomegaly.   : No costo-vertebral angle tenderness. BACK:  Non-tender  UPPER EXT:  Bilateral arm pain in all joints very tender with movement. No effusions, no joint laxity. LOWER EXT: No edema, no calf tenderness. Distal pulses intact. Bilateral leg pain in all joints very tender with movement. No effusions, no joint laxity. NEURO: CN intact, reflexes 2/4 and sym, strength 5/5 and sym, sensation intact. SKIN: No rashes; Normal for age and stage. PSYCH:  Alert and oriented, normal affect. Diagnostic Study Results     Labs -     Recent Results (from the past 12 hour(s))   CBC WITH AUTOMATED DIFF    Collection Time: 06/28/20  9:05 AM   Result Value Ref Range    WBC 8.5 4.6 - 13.2 K/uL    RBC 4.69 (L) 4.70 - 5.50 M/uL    HGB 12.5 (L) 13.0 - 16.0 g/dL    HCT 40.4 36.0 - 48.0 %    MCV 86.1 74.0 - 97.0 FL    MCH 26.7 24.0 - 34.0 PG    MCHC 30.9 (L) 31.0 - 37.0 g/dL    RDW 14.3 11.6 - 14.5 %    PLATELET 217 (H) 650 - 420 K/uL    MPV 9.5 9.2 - 11.8 FL    NEUTROPHILS 70 40 - 73 %    LYMPHOCYTES 21 21 - 52 %    MONOCYTES 7 3 - 10 %    EOSINOPHILS 1 0 - 5 %    BASOPHILS 1 0 - 2 %    ABS. NEUTROPHILS 6.0 1.8 - 8.0 K/UL    ABS. LYMPHOCYTES 1.8 0.9 - 3.6 K/UL    ABS. MONOCYTES 0.6 0.05 - 1.2 K/UL    ABS. EOSINOPHILS 0.1 0.0 - 0.4 K/UL    ABS.  BASOPHILS 0.1 0.0 - 0.1 K/UL    DF AUTOMATED     METABOLIC PANEL, BASIC    Collection Time: 06/28/20  9:05 AM   Result Value Ref Range    Sodium 137 136 - 145 mmol/L    Potassium 3.3 (L) 3.5 - 5.5 mmol/L    Chloride 101 100 - 111 mmol/L    CO2 27 21 - 32 mmol/L    Anion gap 9 3.0 - 18 mmol/L    Glucose 124 (H) 74 - 99 mg/dL    BUN 20 (H) 7.0 - 18 MG/DL    Creatinine 0.97 0.6 - 1.3 MG/DL    BUN/Creatinine ratio 21 (H) 12 - 20      GFR est AA >60 >60 ml/min/1.73m2    GFR est non-AA >60 >60 ml/min/1.73m2    Calcium 9.6 8.5 - 10.1 MG/DL       Radiologic Studies -   No orders to display     CT Results  (Last 48 hours)    None        CXR Results  (Last 48 hours)    None          Medications given in the ED-  Medications   ketorolac (TORADOL) injection 15 mg (15 mg IntraVENous Given 6/28/20 0909)   acetaminophen (TYLENOL) tablet 1,000 mg (1,000 mg Oral Given 6/28/20 0909)   potassium chloride (K-DUR, KLOR-CON) SR tablet 20 mEq (20 mEq Oral Given 6/28/20 1028)         Medical Decision Making   I am the first provider for this patient. I reviewed the vital signs, available nursing notes, past medical history, past surgical history, family history and social history. Vital Signs-Reviewed the patient's vital signs. Pulse Oximetry Analysis -97 % on RA    Cardiac Monitor:  Rate: 85 bpm  Rhythm: NSR    Records Reviewed: Nursing Notes    Provider Notes (Medical Decision Making):   DDX: Arthritis, septic bursitis, chronic pain    Procedures:  Procedures    ED Course:   8:50 AM Initial assessment performed. The patients presenting problems have been discussed, and they are in agreement with the care plan formulated and outlined with them. I have encouraged them to ask questions as they arise throughout their visit. Diagnosis and Disposition     Discussion:  72 y.o. male with chronic arthritis pain is being managed by an orthopedics doctor in [place]. Patient has no new symptoms today. Patient has no signs of systemic infection in the ED today. Patient may take his home medications and follow-up with his orthopedic doctor next week. Patient understands and agrees with this plan. DISCHARGE NOTE:  10:48 AM   Felicia Taylor  results have been reviewed with him. He has been counseled regarding his diagnosis, treatment, and plan. He verbally conveys understanding and agreement of the signs, symptoms, diagnosis, treatment and prognosis and additionally agrees to follow up as discussed. He also agrees with the care-plan and conveys that all of his questions have been answered.   I have also provided discharge instructions for him that include: educational information regarding their diagnosis and treatment, and list of reasons why they would want to return to the ED prior to their follow-up appointment, should his condition change. He has been provided with education for proper emergency department utilization. CLINICAL IMPRESSION:    1. Hypokalemia    2. Pain in both feet    3. Hip pain, bilateral    4. Acute pain of both knees    5. Bilateral arm pain        PLAN:  1. D/C Home  2. Current Discharge Medication List        3. Follow-up Information     Follow up With Specialties Details Why Contact Andres Lopez, DO Orthopedic Surgery Schedule an appointment as soon as possible for a visit in 1 week For follow up from Emergency Department visit. [address]      THE [facility] EMERGENCY DEPT Emergency Medicine  As needed; If symptoms worsen [address] [id number]  [address]     Please note that this dictation was completed with Cross[employer], the computer voice recognition software. Quite often unanticipated grammatical, syntax, homophones, and other interpretive errors are inadvertently transcribed by the computer software. Please disregard these errors. Please excuse any errors that have escaped final proofreading.     Tegan Bruce MD

## 2020-06-28 NOTE — ED TRIAGE NOTES
Pt states began having increase pain in all joints 1.5 days ago, pt states current meds not working, pt reports MD states he will change his meds at next appointment, Wed. Next week.  Pt states having difficulty getting out of bed due to pain

## 2022-03-18 PROBLEM — I67.2 CEREBRAL ATHEROSCLEROSIS: Status: ACTIVE | Noted: 2019-08-22

## 2022-03-18 PROBLEM — R55 NEAR SYNCOPE: Status: ACTIVE | Noted: 2019-11-13

## 2022-03-19 PROBLEM — D72.828 OTHER ELEVATED WHITE BLOOD CELL COUNT: Status: ACTIVE | Noted: 2019-11-13

## 2022-03-19 PROBLEM — E86.0 DEHYDRATION: Status: ACTIVE | Noted: 2019-11-13

## 2022-03-19 PROBLEM — I10 HTN (HYPERTENSION): Status: ACTIVE | Noted: 2019-08-21

## 2022-03-19 PROBLEM — E87.6 HYPOKALEMIA: Status: ACTIVE | Noted: 2019-08-21

## 2022-03-20 PROBLEM — G45.9 TIA (TRANSIENT ISCHEMIC ATTACK): Status: ACTIVE | Noted: 2019-08-21

## 2022-03-20 PROBLEM — F17.200 SMOKER: Status: ACTIVE | Noted: 2019-08-21

## 2022-03-20 PROBLEM — E11.9 DM W/O COMPLICATION TYPE II (HCC): Status: ACTIVE | Noted: 2019-08-21

## 2022-03-20 PROBLEM — M19.90 OA (OSTEOARTHRITIS): Status: ACTIVE | Noted: 2019-08-21

## 2025-04-08 ENCOUNTER — TRANSCRIBE ORDERS (OUTPATIENT)
Facility: HOSPITAL | Age: 75
End: 2025-04-08

## 2025-04-08 ENCOUNTER — HOSPITAL ENCOUNTER (OUTPATIENT)
Facility: HOSPITAL | Age: 75
Discharge: HOME | End: 2025-04-11
Payer: MEDICARE

## 2025-04-08 DIAGNOSIS — Z79.620 LONG TERM (CURRENT) USE OF IMMUNOSUPPRESSIVE BIOLOGIC: ICD-10-CM

## 2025-04-08 DIAGNOSIS — Z79.631: ICD-10-CM

## 2025-04-08 DIAGNOSIS — M05.79 RHEU ARTHRITIS W RHEU FACTOR MULT SITE W/O ORG/SYS INVOLV (HCC): Primary | ICD-10-CM

## 2025-04-08 DIAGNOSIS — M05.79 RHEU ARTHRITIS W RHEU FACTOR MULT SITE W/O ORG/SYS INVOLV (HCC): ICD-10-CM

## 2025-04-08 PROCEDURE — 71046 X-RAY EXAM CHEST 2 VIEWS: CPT

## 2025-04-25 ENCOUNTER — HOSPITAL ENCOUNTER (OUTPATIENT)
Facility: HOSPITAL | Age: 75
Discharge: HOME | End: 2025-04-28
Payer: MEDICARE

## 2025-04-25 DIAGNOSIS — R91.8 LUNG NODULES: ICD-10-CM

## 2025-04-25 PROCEDURE — 71250 CT THORAX DX C-: CPT

## 2025-05-28 PROBLEM — M81.0 SENILE OSTEOPOROSIS: Status: ACTIVE | Noted: 2025-05-28

## 2025-05-28 RX ORDER — SODIUM CHLORIDE 9 MG/ML
5-250 INJECTION, SOLUTION INTRAVENOUS PRN
OUTPATIENT
Start: 2025-06-05

## 2025-05-28 RX ORDER — HEPARIN 100 UNIT/ML
500 SYRINGE INTRAVENOUS PRN
OUTPATIENT
Start: 2025-06-05

## 2025-05-28 RX ORDER — DIPHENHYDRAMINE HYDROCHLORIDE 50 MG/ML
50 INJECTION, SOLUTION INTRAMUSCULAR; INTRAVENOUS
OUTPATIENT
Start: 2025-06-05

## 2025-05-28 RX ORDER — EPINEPHRINE 1 MG/ML
0.3 INJECTION, SOLUTION INTRAMUSCULAR; SUBCUTANEOUS PRN
OUTPATIENT
Start: 2025-06-05

## 2025-05-28 RX ORDER — HYDROCORTISONE SODIUM SUCCINATE 100 MG/2ML
100 INJECTION INTRAMUSCULAR; INTRAVENOUS
OUTPATIENT
Start: 2025-06-05

## 2025-05-28 RX ORDER — SODIUM CHLORIDE 9 MG/ML
INJECTION, SOLUTION INTRAVENOUS CONTINUOUS
OUTPATIENT
Start: 2025-06-05

## 2025-05-28 RX ORDER — ONDANSETRON 2 MG/ML
8 INJECTION INTRAMUSCULAR; INTRAVENOUS
OUTPATIENT
Start: 2025-06-05

## 2025-05-28 RX ORDER — ALBUTEROL SULFATE 90 UG/1
4 INHALANT RESPIRATORY (INHALATION) PRN
OUTPATIENT
Start: 2025-06-05

## 2025-05-28 RX ORDER — ACETAMINOPHEN 325 MG/1
650 TABLET ORAL
OUTPATIENT
Start: 2025-06-05

## 2025-06-05 ENCOUNTER — HOSPITAL ENCOUNTER (OUTPATIENT)
Facility: HOSPITAL | Age: 75
Setting detail: INFUSION SERIES
Discharge: HOME | End: 2025-06-05
Payer: MEDICARE

## 2025-06-05 VITALS
TEMPERATURE: 97.8 F | SYSTOLIC BLOOD PRESSURE: 146 MMHG | OXYGEN SATURATION: 97 % | HEART RATE: 75 BPM | RESPIRATION RATE: 18 BRPM | DIASTOLIC BLOOD PRESSURE: 65 MMHG

## 2025-06-05 DIAGNOSIS — M81.0 SENILE OSTEOPOROSIS: Primary | ICD-10-CM

## 2025-06-05 PROCEDURE — 2500000003 HC RX 250 WO HCPCS: Performed by: INTERNAL MEDICINE

## 2025-06-05 PROCEDURE — 6360000002 HC RX W HCPCS: Performed by: INTERNAL MEDICINE

## 2025-06-05 PROCEDURE — 96365 THER/PROPH/DIAG IV INF INIT: CPT

## 2025-06-05 RX ORDER — SODIUM CHLORIDE 9 MG/ML
INJECTION, SOLUTION INTRAVENOUS CONTINUOUS
OUTPATIENT
Start: 2026-05-31

## 2025-06-05 RX ORDER — HEPARIN 100 UNIT/ML
500 SYRINGE INTRAVENOUS PRN
OUTPATIENT
Start: 2026-05-31

## 2025-06-05 RX ORDER — ZOLEDRONIC ACID 0.05 MG/ML
5 INJECTION, SOLUTION INTRAVENOUS ONCE
Status: CANCELLED | OUTPATIENT
Start: 2026-05-31 | End: 2026-05-31

## 2025-06-05 RX ORDER — DIPHENHYDRAMINE HYDROCHLORIDE 50 MG/ML
50 INJECTION, SOLUTION INTRAMUSCULAR; INTRAVENOUS
OUTPATIENT
Start: 2026-05-31

## 2025-06-05 RX ORDER — ACETAMINOPHEN 325 MG/1
650 TABLET ORAL
OUTPATIENT
Start: 2026-05-31

## 2025-06-05 RX ORDER — HYDROCORTISONE SODIUM SUCCINATE 100 MG/2ML
100 INJECTION INTRAMUSCULAR; INTRAVENOUS
OUTPATIENT
Start: 2026-05-31

## 2025-06-05 RX ORDER — LIDOCAINE 4 G/G
1 PATCH TOPICAL
COMMUNITY
Start: 2025-05-16

## 2025-06-05 RX ORDER — SODIUM CHLORIDE 0.9 % (FLUSH) 0.9 %
5-40 SYRINGE (ML) INJECTION PRN
Status: DISCONTINUED | OUTPATIENT
Start: 2025-06-05 | End: 2025-06-06 | Stop reason: HOSPADM

## 2025-06-05 RX ORDER — FOLIC ACID 1 MG/1
1000 TABLET ORAL DAILY
COMMUNITY
Start: 2025-04-04

## 2025-06-05 RX ORDER — EPINEPHRINE 1 MG/ML
0.3 INJECTION, SOLUTION INTRAMUSCULAR; SUBCUTANEOUS PRN
OUTPATIENT
Start: 2026-05-31

## 2025-06-05 RX ORDER — ACETAMINOPHEN 325 MG/1
650 TABLET ORAL ONCE
Status: CANCELLED | OUTPATIENT
Start: 2026-05-31 | End: 2026-05-31

## 2025-06-05 RX ORDER — ALBUTEROL SULFATE 90 UG/1
4 INHALANT RESPIRATORY (INHALATION) PRN
OUTPATIENT
Start: 2026-05-31

## 2025-06-05 RX ORDER — SODIUM CHLORIDE 9 MG/ML
5-250 INJECTION, SOLUTION INTRAVENOUS PRN
OUTPATIENT
Start: 2026-05-31

## 2025-06-05 RX ORDER — NICOTINE 21 MG/24HR
1 PATCH, TRANSDERMAL 24 HOURS TRANSDERMAL DAILY
COMMUNITY
Start: 2025-05-17 | End: 2025-06-16

## 2025-06-05 RX ORDER — ZOLEDRONIC ACID 0.05 MG/ML
5 INJECTION, SOLUTION INTRAVENOUS ONCE
Status: COMPLETED | OUTPATIENT
Start: 2025-06-05 | End: 2025-06-05

## 2025-06-05 RX ORDER — ACETAMINOPHEN 325 MG/1
650 TABLET ORAL ONCE
Status: DISCONTINUED | OUTPATIENT
Start: 2025-06-05 | End: 2025-06-06 | Stop reason: HOSPADM

## 2025-06-05 RX ORDER — SODIUM CHLORIDE 0.9 % (FLUSH) 0.9 %
5-40 SYRINGE (ML) INJECTION PRN
OUTPATIENT
Start: 2026-05-31

## 2025-06-05 RX ORDER — ONDANSETRON 2 MG/ML
8 INJECTION INTRAMUSCULAR; INTRAVENOUS
OUTPATIENT
Start: 2026-05-31

## 2025-06-05 RX ADMIN — Medication 10 ML: at 10:09

## 2025-06-05 RX ADMIN — ZOLEDRONIC ACID 5 MG: 5 INJECTION INTRAVENOUS at 09:43

## 2025-06-05 NOTE — PROGRESS NOTES
[facility] Progress Note    Date: 2025    Name: Teo Flannery    MRN: [id number]         : 1950    Reclast       Mr. Flannery arrived to [facility] at 0851, ambulatory with cane and accompanied by his wife. Patient stated that he has been receiving Reclast for several years. Then he stated that he was receiving an IV infusion over 2 hours, and an injection. Patient's wife was asked and did not know what medications he was receiving either.  Looking at patient's chart, patient had been receiving Remicade and Methotrexate at an infusion center in [place].     [facility] educational pamphlet on Reclast was reviewed with patient, signature page signed and all questions answered. Patient had no concerns regarding his treatment today.     Mr. Flannery was assessed and education was provided.     Mr. Flannery's vitals were reviewed.  Vitals:    25 1030   BP: (!) 146/65   Pulse: 75   Resp: 18   Temp: 97.8 °F (36.6 °C)   SpO2: 97%       #24 g IV inserted in patient's left wrist x 1 attempt.  Positive for blood return/ flushed with 10 ml NS without difficulty. Tegaderm dressing applied. Patient tolerated well.     Reclast 5 mg was infused over approximately 15 minutes as per order.     Mr. Flannery tolerated the infusion well. Vitals stable, and he had no c/o SOB, chest pain, throat tightness, lip/tongue/facial swelling, rash/itching/hives or any other complaints.     Patient stated that he had taken 1000 mg Tylenol at 0800 this morning. Tylenol 650 mg (MD order) was held. Patient was notified that he can take tylenol every 8 hours for the next 72 hours for flu-like symptoms,  not to exceed 3,000 mg. He stated that he understood.     Patient remained for 30 minutes observation post infusion. Vitals remained stable, and he remained without any complaints.     Patient Vitals for the past 12 hrs:   Temp Pulse Resp BP SpO2   25 1030 97.8 °F (36.6 °C) 75 18 (!) 146/65 97 %   25 1007 97.5 °F (36.4 °C) 71 18 (!) 136/50 94